# Patient Record
Sex: MALE | Race: BLACK OR AFRICAN AMERICAN
[De-identification: names, ages, dates, MRNs, and addresses within clinical notes are randomized per-mention and may not be internally consistent; named-entity substitution may affect disease eponyms.]

---

## 2019-07-22 ENCOUNTER — HOSPITAL ENCOUNTER (EMERGENCY)
Dept: HOSPITAL 50 - VM.ED | Age: 19
LOS: 1 days | Discharge: HOME | End: 2019-07-23
Payer: COMMERCIAL

## 2019-07-22 DIAGNOSIS — N17.9: Primary | ICD-10-CM

## 2019-07-22 DIAGNOSIS — S70.11XA: ICD-10-CM

## 2019-07-22 DIAGNOSIS — R74.8: ICD-10-CM
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
## 2019-07-22 LAB
ANION GAP SERPL CALC-SCNC: 13.6 MMOL/L (ref 10–20)
CHLORIDE SERPL-SCNC: 103 MMOL/L (ref 98–107)
SODIUM SERPL-SCNC: 143 MMOL/L (ref 136–145)

## 2019-07-22 PROCEDURE — 83735 ASSAY OF MAGNESIUM: CPT

## 2019-07-22 PROCEDURE — 73700 CT LOWER EXTREMITY W/O DYE: CPT

## 2019-07-22 PROCEDURE — 96361 HYDRATE IV INFUSION ADD-ON: CPT

## 2019-07-22 PROCEDURE — 82550 ASSAY OF CK (CPK): CPT

## 2019-07-22 PROCEDURE — 96360 HYDRATION IV INFUSION INIT: CPT

## 2019-07-22 PROCEDURE — 99284 EMERGENCY DEPT VISIT MOD MDM: CPT

## 2019-07-22 PROCEDURE — 80048 BASIC METABOLIC PNL TOTAL CA: CPT

## 2019-07-22 PROCEDURE — 85025 COMPLETE CBC W/AUTO DIFF WBC: CPT

## 2019-07-22 NOTE — EDM.PDOC
ED HPI GENERAL MEDICAL PROBLEM





- General


Chief Complaint: Lower Extremity Injury/Pain


Stated Complaint: INJURY TO R THIGH


Time Seen by Provider: 07/22/19 23:06


Source of Information: Reports: Patient, RN, RN Notes Reviewed


History Limitations: Reports: No Limitations





- History of Present Illness


INITIAL COMMENTS - FREE TEXT/NARRATIVE: 


Patient presents to the ED at [facility] for the evaluation of the right 

lateral lower thigh injury. The patient states the injury occurred one week 

ago. He states during football practice, another player rammed his knee into 

the patients thigh. The patient did not do anything initially with the injury. 

He has been "just trying to walk it off."  The thigh pain has progressively 

gotten worse to the point of trouble with ambulation. He is only able to bend 

his knee about 10 degrees. He has a previous right knee arthroscopy. He denies 

any numbness, tingling, or paresthesia to the RLE.


  ** Right Upper Thigh


Pain Score (Numeric/FACES): 8





- Related Data


 Allergies











Allergy/AdvReac Type Severity Reaction Status Date / Time


 


No Known Allergies Allergy   Verified 07/22/19 23:11











Home Meds: 


 Home Meds





. [No Known Home Meds]  04/14/19 [History]











Past Medical History





- Past Health History


Medical/Surgical History: Denies Medical/Surgical History





Review of Systems





- Review of Systems


Review Of Systems: See Below


Constitutional: Denies: Chills, Fever


Respiratory: Denies: Shortness of Breath, Cough


Cardiovascular: Denies: Chest Pain, Palpitations


Musculoskeletal: Reports: Leg Pain, Muscle Pain, Muscle Stiffness


Skin: Reports: Erythema (right lateral thigh)


Neurological: Reports: No Symptoms





ED EXAM, GENERAL





- Physical Exam


Exam: See Below


Exam Limited By: No Limitations


General Appearance: Alert, No Apparent Distress


Respiratory/Chest: No Respiratory Distress, Lungs Clear, Normal Breath Sounds


Cardiovascular: Normal Peripheral Pulses, Regular Rate, Rhythm


Peripheral Pulses: 2+: Posterior Tibial (L), Posterior Tibial (R), Dorsalis 

Pedis (L), Dorsalis Pedis (R)


Extremities: Leg Pain, Limited Range of Motion, Increased Warmth, Redness, 

Other (swelling of the right lower lateral thigh; very taught; unable to bend 

knee past 10 degrees; tender to light palpation)


Neurological: Alert, Oriented





Course





- Vital Signs


Last Recorded V/S: 


 Last Vital Signs











Temp  97.5 F   07/22/19 23:28


 


Pulse  75   07/22/19 23:28


 


Resp  14   07/22/19 23:28


 


BP  167/59 H  07/22/19 23:28


 


Pulse Ox  97   07/22/19 23:28














- Orders/Labs/Meds


Orders: 


 Active Orders 24 hr











 Category Date Time Status


 


 Lower Extremity wo Cont Rt [CT] Stat Exams  07/22/19 23:08 Taken


 


 Sodium Chloride 0.9% [Normal Saline] 2,000 ml Med  07/22/19 23:54 Active





 IV ONETIME   


 


 Sodium Chloride 0.9% [Saline Flush] Med  07/22/19 23:12 Active





 10 ml FLUSH ASDIRECTED PRN   


 


 Peripheral IV Insertion Adult [OM.PC] Routine Oth  07/22/19 23:12 Ordered








 Medication Orders





Sodium Chloride (Normal Saline)  2,000 mls @ 999 mls/hr IV ONETIME ONE


   Stop: 07/23/19 01:54


   Last Admin: 07/22/19 23:59  Dose: 999 mls/hr


Sodium Chloride (Saline Flush)  10 ml FLUSH ASDIRECTED PRN


   PRN Reason: Keep Vein Open








Labs: 


 Laboratory Tests











  07/22/19 07/22/19 07/22/19 Range/Units





  23:20 23:20 23:20 


 


WBC  8.9    (4.0-10.0)  x10^3/uL


 


RBC  4.96    (4.5-6.0)  x10^6/uL


 


Hgb  15.2    (14.0-18.0)  g/dL


 


Hct  45.1    (40.0-52.0)  %


 


MCV  90.9    (78.0-93.0)  fL


 


MCH  30.6    (26.0-32.0)  pg


 


MCHC  33.7    (32.0-36.0)  g/dL


 


RDW Coeff of Catia  12.8    (10.0-15.0)  %


 


Plt Count  228    (130-400)  x10^3/uL


 


Neut % (Auto)  58.3    (50.0-80.0)  %


 


Lymph % (Auto)  31.2    (25.0-50.0)  %


 


Mono % (Auto)  9.6    (2.0-11.0)  %


 


Eos % (Auto)  0.6    (0.0-4.0)  %


 


Baso % (Auto)  0.3    (0.2-1.2)  %


 


Sodium   143   (136-145)  mmol/L


 


Potassium   3.6   (3.5-5.1)  mmol/L


 


Chloride   103   ()  mmol/L


 


Carbon Dioxide   30   (21-32)  mmol/L


 


Anion Gap   13.6   (10-20)  mmol/L


 


BUN   21 H   (7-18)  mg/dL


 


Creatinine   1.5 H   (0.70-1.30)  mg/dL


 


Est Cr Clr Drug Dosing   73.44   mL/min


 


Estimated GFR (MDRD)   > 60   


 


Glucose   111 H   ()  mg/dL


 


Calcium   9.1   (8.5-10.1)  mg/dL


 


Magnesium    1.9  (1.8-2.4)  mg/dL


 


Creatine Kinase   682 H*   ()  U/L











Meds: 


Medications











Generic Name Dose Route Start Last Admin





  Trade Name Freq  PRN Reason Stop Dose Admin


 


Sodium Chloride  2,000 mls @ 999 mls/hr  07/22/19 23:54  07/22/19 23:59





  Normal Saline  IV  07/23/19 01:54  999 mls/hr





  ONETIME ONE   Administration





     





     





     





     


 


Sodium Chloride  10 ml  07/22/19 23:12  





  Saline Flush  FLUSH   





  ASDIRECTED PRN   





  Keep Vein Open   





     





     





     














Departure





- Departure


Time of Disposition: 00:13


Disposition: Home, Self-Care 01


Condition: Good


Clinical Impression: 


 Muscle contusion, DEE (acute kidney injury), Elevated CK








- Discharge Information


*PRESCRIPTION DRUG MONITORING PROGRAM REVIEWED*: Not Applicable


*COPY OF PRESCRIPTION DRUG MONITORING REPORT IN PATIENT WILLIAN: Not Applicable


Instructions:  Acute Kidney Injury, Adult, Contusion


Referrals: 


Reagan Jordan NP [Emergency Provider] - 


Forms:  ED Department Discharge, ED Return to Work/School Form


Additional Instructions: 


1. Stay well hydrated and rest


2. Elevated right lower leg several times a day


3. May use ice/heat


4. Use Tylenol only, no Advil, Motrin, Aleve, or NSAID products


5. Will need extensive physical therapy


6. May consider MRI scan


7. No physical activity until symptoms have resolved


8. See me at the [facility] tomorrow and will get you set up for physical 

therapy





- Problem List Review


Problem List Initiated/Reviewed/Updated: Yes





- My Orders


Last 24 Hours: 


My Active Orders





07/22/19 23:08


Lower Extremity wo Cont Rt [CT] Stat 





07/22/19 23:12


Sodium Chloride 0.9% [Saline Flush]   10 ml FLUSH ASDIRECTED PRN 


Peripheral IV Insertion Adult [OM.PC] Routine 





07/22/19 23:54


Sodium Chloride 0.9% [Normal Saline] 2,000 ml IV ONETIME 














- Assessment/Plan


Last 24 Hours: 


My Active Orders





07/22/19 23:08


Lower Extremity wo Cont Rt [CT] Stat 





07/22/19 23:12


Sodium Chloride 0.9% [Saline Flush]   10 ml FLUSH ASDIRECTED PRN 


Peripheral IV Insertion Adult [OM.PC] Routine 





07/22/19 23:54


Sodium Chloride 0.9% [Normal Saline] 2,000 ml IV ONETIME 











Assessment:: 


Muscle contusion


DEE


Elevated CK


Plan: 


Labs and CT results discussed with patient. Patient needs physical therapy, no 

football or active sports, LOTS of water, and OTC analgesics. Will see patient 

in clinic tomorrow to set up physical therapy. May consider MRI depending upon 

therapy assessment.

## 2019-07-23 NOTE — CT
______________________________________________________________________________   

  

[id number] CT/CT Femur Right WO IV  

EXAM: RIGHT FEMUR CT WITHOUT CONTRAST  

   

 INDICATION: Right thigh contusion with eye pain and ataxia.  

   

 COMPARISON: None.  

   

 DISCUSSION: Ill-defined hypodensity is suggested in the distal aspect of the  

 vastus intermedius which could represent a contusion or sequela of muscle  

 strain. MRI could provide further assessment if there is continued clinical  

 concern. No fracture or dislocation.  

   

 IMPRESSION:  

 1.  Possible ill-defined hematoma within the distal aspect of the vastus  

 intermedius.  

 2.  Negative for acute fracture.  

   

 Electronically signed by Oswaldo Coffey MD on 7/23/2019 8:23 AM  

   

  

Oswaldo Coffey MD                 

 07/23/19 0825    

  

Thank you for allowing us to participate in the care of your patient.

## 2019-10-06 ENCOUNTER — HOSPITAL ENCOUNTER (EMERGENCY)
Dept: HOSPITAL 50 - VM.ED | Age: 19
Discharge: HOME | End: 2019-10-06
Payer: COMMERCIAL
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
## 2019-10-06 DIAGNOSIS — S93.601A: Primary | ICD-10-CM

## 2019-10-06 NOTE — CR
______________________________________________________________________________   

  

[id number] RAD/RAD Foot Right 2V  

EXAM: RIGHT FOOT 2 VIEWS  

   

 INDICATION: FOOT PAIN.  

   

 COMPARISON: None.  

   

 DISCUSSION: Soft tissue swelling in the dorsum of the foot. Mild first  

 metatarsophalangeal and first tarsometatarsal osteoarthritis. No acute fracture  

 or dislocation is identified.  

   

 IMPRESSION:  

 1.  Soft tissue swelling.  

 2.  Mild osteoarthritis.  

   

 Electronically signed by Oswaldo Coffey MD on 10/6/2019 12:43 PM  

   

  

Oswaldo Coffey MD                 

 10/06/19 1246    

  

Thank you for allowing us to participate in the care of your patient.

## 2022-06-27 ENCOUNTER — APPOINTMENT (RX ONLY)
Dept: URBAN - METROPOLITAN AREA CLINIC 6 | Facility: CLINIC | Age: 22
Setting detail: DERMATOLOGY
End: 2022-06-27

## 2022-06-27 DIAGNOSIS — B36.0 PITYRIASIS VERSICOLOR: ICD-10-CM

## 2022-06-27 PROCEDURE — ? DIAGNOSIS COMMENT

## 2022-06-27 PROCEDURE — 99203 OFFICE O/P NEW LOW 30 MIN: CPT

## 2022-06-27 PROCEDURE — ? COUNSELING

## 2022-06-27 PROCEDURE — ? PRESCRIPTION

## 2022-06-27 RX ORDER — KETOCONAZOLE 20 MG/G
1 CREAM TOPICAL BID
Qty: 60 | Refills: 5 | Status: ERX | COMMUNITY
Start: 2022-06-27

## 2022-06-27 RX ADMIN — KETOCONAZOLE 1: 20 CREAM TOPICAL at 00:00

## 2022-06-27 ASSESSMENT — LOCATION DETAILED DESCRIPTION DERM
LOCATION DETAILED: LEFT SUPERIOR LATERAL MIDBACK
LOCATION DETAILED: SUPERIOR LUMBAR SPINE
LOCATION DETAILED: RIGHT SUPERIOR LATERAL MIDBACK

## 2022-06-27 ASSESSMENT — LOCATION SIMPLE DESCRIPTION DERM
LOCATION SIMPLE: RIGHT LOWER BACK
LOCATION SIMPLE: LEFT LOWER BACK
LOCATION SIMPLE: LOWER BACK

## 2022-06-27 ASSESSMENT — LOCATION ZONE DERM: LOCATION ZONE: TRUNK

## 2022-06-27 NOTE — PROCEDURE: DIAGNOSIS COMMENT
Comment: Present for one year. Will treat with topical ketoconazole cream daily x 1 week, then weekly x 1 month. \\nEncouraged sun protective clothing and sunscreen when outdoors. Follow up in 3 months.
Render Risk Assessment In Note?: no
Detail Level: Simple

## 2022-08-20 ENCOUNTER — HOSPITAL ENCOUNTER (EMERGENCY)
Facility: MEDICAL CENTER | Age: 22
End: 2022-08-21
Attending: EMERGENCY MEDICINE
Payer: COMMERCIAL

## 2022-08-20 ENCOUNTER — APPOINTMENT (OUTPATIENT)
Dept: RADIOLOGY | Facility: MEDICAL CENTER | Age: 22
End: 2022-08-20
Attending: EMERGENCY MEDICINE
Payer: COMMERCIAL

## 2022-08-20 DIAGNOSIS — E87.6 HYPOKALEMIA: ICD-10-CM

## 2022-08-20 DIAGNOSIS — I48.91 ATRIAL FIBRILLATION WITH RVR (HCC): ICD-10-CM

## 2022-08-20 DIAGNOSIS — T43.615A ADVERSE EFFECT OF CAFFEINE, INITIAL ENCOUNTER: ICD-10-CM

## 2022-08-20 LAB
ALBUMIN SERPL BCP-MCNC: 4.4 G/DL (ref 3.2–4.9)
ALBUMIN/GLOB SERPL: 1.7 G/DL
ALP SERPL-CCNC: 61 U/L (ref 30–99)
ALT SERPL-CCNC: 27 U/L (ref 2–50)
ANION GAP SERPL CALC-SCNC: 15 MMOL/L (ref 7–16)
AST SERPL-CCNC: 37 U/L (ref 12–45)
BASOPHILS # BLD AUTO: 0.4 % (ref 0–1.8)
BASOPHILS # BLD: 0.03 K/UL (ref 0–0.12)
BILIRUB SERPL-MCNC: 0.3 MG/DL (ref 0.1–1.5)
BUN SERPL-MCNC: 13 MG/DL (ref 8–22)
CALCIUM SERPL-MCNC: 9.4 MG/DL (ref 8.5–10.5)
CHLORIDE SERPL-SCNC: 104 MMOL/L (ref 96–112)
CO2 SERPL-SCNC: 19 MMOL/L (ref 20–33)
CREAT SERPL-MCNC: 1.1 MG/DL (ref 0.5–1.4)
EOSINOPHIL # BLD AUTO: 0.03 K/UL (ref 0–0.51)
EOSINOPHIL NFR BLD: 0.4 % (ref 0–6.9)
ERYTHROCYTE [DISTWIDTH] IN BLOOD BY AUTOMATED COUNT: 43 FL (ref 35.9–50)
GFR SERPLBLD CREATININE-BSD FMLA CKD-EPI: 96 ML/MIN/1.73 M 2
GLOBULIN SER CALC-MCNC: 2.6 G/DL (ref 1.9–3.5)
GLUCOSE SERPL-MCNC: 135 MG/DL (ref 65–99)
HCT VFR BLD AUTO: 46.2 % (ref 42–52)
HGB BLD-MCNC: 16 G/DL (ref 14–18)
IMM GRANULOCYTES # BLD AUTO: 0.01 K/UL (ref 0–0.11)
IMM GRANULOCYTES NFR BLD AUTO: 0.1 % (ref 0–0.9)
LYMPHOCYTES # BLD AUTO: 3.06 K/UL (ref 1–4.8)
LYMPHOCYTES NFR BLD: 41.9 % (ref 22–41)
MCH RBC QN AUTO: 30.6 PG (ref 27–33)
MCHC RBC AUTO-ENTMCNC: 34.6 G/DL (ref 33.7–35.3)
MCV RBC AUTO: 88.3 FL (ref 81.4–97.8)
MONOCYTES # BLD AUTO: 0.54 K/UL (ref 0–0.85)
MONOCYTES NFR BLD AUTO: 7.4 % (ref 0–13.4)
NEUTROPHILS # BLD AUTO: 3.64 K/UL (ref 1.82–7.42)
NEUTROPHILS NFR BLD: 49.8 % (ref 44–72)
NRBC # BLD AUTO: 0 K/UL
NRBC BLD-RTO: 0 /100 WBC
PLATELET # BLD AUTO: 250 K/UL (ref 164–446)
PMV BLD AUTO: 10.7 FL (ref 9–12.9)
POTASSIUM SERPL-SCNC: 3.4 MMOL/L (ref 3.6–5.5)
PROT SERPL-MCNC: 7 G/DL (ref 6–8.2)
RBC # BLD AUTO: 5.23 M/UL (ref 4.7–6.1)
SODIUM SERPL-SCNC: 138 MMOL/L (ref 135–145)
TROPONIN T SERPL-MCNC: 16 NG/L (ref 6–19)
WBC # BLD AUTO: 7.3 K/UL (ref 4.8–10.8)

## 2022-08-20 PROCEDURE — 700105 HCHG RX REV CODE 258: Performed by: EMERGENCY MEDICINE

## 2022-08-20 PROCEDURE — 93005 ELECTROCARDIOGRAM TRACING: CPT | Performed by: EMERGENCY MEDICINE

## 2022-08-20 PROCEDURE — 85025 COMPLETE CBC W/AUTO DIFF WBC: CPT

## 2022-08-20 PROCEDURE — 71045 X-RAY EXAM CHEST 1 VIEW: CPT

## 2022-08-20 PROCEDURE — 700111 HCHG RX REV CODE 636 W/ 250 OVERRIDE (IP): Mod: JW | Performed by: EMERGENCY MEDICINE

## 2022-08-20 PROCEDURE — 99285 EMERGENCY DEPT VISIT HI MDM: CPT

## 2022-08-20 PROCEDURE — 84484 ASSAY OF TROPONIN QUANT: CPT

## 2022-08-20 PROCEDURE — 96375 TX/PRO/DX INJ NEW DRUG ADDON: CPT

## 2022-08-20 PROCEDURE — 80053 COMPREHEN METABOLIC PANEL: CPT

## 2022-08-20 PROCEDURE — 36415 COLL VENOUS BLD VENIPUNCTURE: CPT

## 2022-08-20 RX ORDER — POTASSIUM CHLORIDE 20 MEQ/1
20 TABLET, EXTENDED RELEASE ORAL ONCE
Status: COMPLETED | OUTPATIENT
Start: 2022-08-20 | End: 2022-08-21

## 2022-08-20 RX ORDER — MIDAZOLAM HYDROCHLORIDE 1 MG/ML
1 INJECTION INTRAMUSCULAR; INTRAVENOUS ONCE
Status: COMPLETED | OUTPATIENT
Start: 2022-08-20 | End: 2022-08-20

## 2022-08-20 RX ORDER — MAGNESIUM SULFATE 1 G/100ML
1 INJECTION INTRAVENOUS ONCE
Status: COMPLETED | OUTPATIENT
Start: 2022-08-20 | End: 2022-08-21

## 2022-08-20 RX ORDER — DILTIAZEM HYDROCHLORIDE 5 MG/ML
10 INJECTION INTRAVENOUS ONCE
Status: COMPLETED | OUTPATIENT
Start: 2022-08-20 | End: 2022-08-20

## 2022-08-20 RX ORDER — SODIUM CHLORIDE 9 MG/ML
1000 INJECTION, SOLUTION INTRAVENOUS ONCE
Status: COMPLETED | OUTPATIENT
Start: 2022-08-20 | End: 2022-08-21

## 2022-08-20 RX ADMIN — MIDAZOLAM HYDROCHLORIDE 1 MG: 1 INJECTION, SOLUTION INTRAMUSCULAR; INTRAVENOUS at 22:36

## 2022-08-20 RX ADMIN — DILTIAZEM HYDROCHLORIDE 10 MG: 5 INJECTION INTRAVENOUS at 22:36

## 2022-08-20 RX ADMIN — SODIUM CHLORIDE 1000 ML: 9 INJECTION, SOLUTION INTRAVENOUS at 22:36

## 2022-08-20 ASSESSMENT — LIFESTYLE VARIABLES
EVER HAD A DRINK FIRST THING IN THE MORNING TO STEADY YOUR NERVES TO GET RID OF A HANGOVER: NO
TOTAL SCORE: 0
ON A TYPICAL DAY WHEN YOU DRINK ALCOHOL HOW MANY DRINKS DO YOU HAVE: 0
HAVE YOU EVER FELT YOU SHOULD CUT DOWN ON YOUR DRINKING: NO
HAVE PEOPLE ANNOYED YOU BY CRITICIZING YOUR DRINKING: NO
TOTAL SCORE: 0
AVERAGE NUMBER OF DAYS PER WEEK YOU HAVE A DRINK CONTAINING ALCOHOL: 0
TOTAL SCORE: 0
DO YOU DRINK ALCOHOL: NO
HOW MANY TIMES IN THE PAST YEAR HAVE YOU HAD 5 OR MORE DRINKS IN A DAY: 0
EVER FELT BAD OR GUILTY ABOUT YOUR DRINKING: NO
DOES PATIENT WANT TO STOP DRINKING: NO
CONSUMPTION TOTAL: NEGATIVE

## 2022-08-21 VITALS
RESPIRATION RATE: 15 BRPM | TEMPERATURE: 98.5 F | HEIGHT: 67 IN | HEART RATE: 91 BPM | DIASTOLIC BLOOD PRESSURE: 66 MMHG | OXYGEN SATURATION: 93 % | WEIGHT: 210 LBS | BODY MASS INDEX: 32.96 KG/M2 | SYSTOLIC BLOOD PRESSURE: 147 MMHG

## 2022-08-21 LAB
EKG IMPRESSION: NORMAL
EKG IMPRESSION: NORMAL

## 2022-08-21 PROCEDURE — A9270 NON-COVERED ITEM OR SERVICE: HCPCS | Performed by: EMERGENCY MEDICINE

## 2022-08-21 PROCEDURE — 96365 THER/PROPH/DIAG IV INF INIT: CPT

## 2022-08-21 PROCEDURE — 700102 HCHG RX REV CODE 250 W/ 637 OVERRIDE(OP): Performed by: EMERGENCY MEDICINE

## 2022-08-21 PROCEDURE — 700111 HCHG RX REV CODE 636 W/ 250 OVERRIDE (IP): Performed by: EMERGENCY MEDICINE

## 2022-08-21 RX ADMIN — MAGNESIUM SULFATE IN DEXTROSE 1 G: 10 INJECTION, SOLUTION INTRAVENOUS at 00:12

## 2022-08-21 RX ADMIN — POTASSIUM CHLORIDE 20 MEQ: 1500 TABLET, EXTENDED RELEASE ORAL at 00:12

## 2022-08-21 NOTE — DISCHARGE INSTRUCTIONS
Avoid alcohol and caffeine. Return if you have new or different chest pain, shortness of breath, pass out or persistently rapid heart rate above 110.

## 2022-08-21 NOTE — ED PROVIDER NOTES
ED Provider Note    Scribed for Joel Cole M.D. by Thang Diamond. 8/20/2022, 10:23 PM.    Primary care provider: None noted  Means of arrival: EMS  History obtained from: Patient  History limited by: None    CHIEF COMPLAINT  Chief Complaint   Patient presents with    Chest Pain     Pt took 500mg of caffeine and preworkout this afternoon. He vomited some of it up and then went to dinner. Chest pain began at 2130. 12 lead with EMS showed afib with RVR, rates between 110-160    Anxiety       HPI  Jinny Desai is a 21 y.o. male who presents to the Emergency Department for evaluation of chest pain/pressure onset this morning. Patient states he was at the gym this morning. He took both an energy drink with 500 mg caffeine and preworkout. While doing squats, he experienced chest tightness and palpitations lasting for 30 minutes. It resolved but after he got home, he experienced another episode. He called EMS due to ongoing chest tightness and palpitations. He reports anxiety.  He had one epiosde of vomiting and some chills. Denies any swelling or pain of one leg. Denies fevers or cough. He notes that he had 6-7 alcoholic beverages last night. Denies any known history of heart problems. Denies any regular medications. Denies any significant medical history. He vapes socially. States he drinks alcohol on occasion. He reports occasional marijuana use. Denies meth use.    REVIEW OF SYSTEMS  Pertinent positives include chest pain/pressure, palpitations, anxiety, chills, vomiting. Pertinent negatives include leg pain/swelling, fevers, cough. All other systems negative.    PAST MEDICAL HISTORY   Patient denies any known significant medical history.    SURGICAL HISTORY  patient denies any surgical history    SOCIAL HISTORY  Social History     Tobacco Use    Smoking status: Never    Smokeless tobacco: Never   Vaping Use    Vaping Use: Never used   Substance Use Topics    Alcohol use: Yes     Alcohol/week: 7.2 oz  "    Types: 12 Cans of beer per week    Drug use: Yes     Comment: occ marijuana      Social History     Substance and Sexual Activity   Drug Use Yes    Comment: occ marijuana       FAMILY HISTORY  History reviewed. No pertinent family history.    CURRENT MEDICATIONS  Home Medications    **Home medications have not yet been reviewed for this encounter**         ALLERGIES  No Known Allergies    PHYSICAL EXAM  VITAL SIGNS: BP (!) 149/81   Pulse (!) 120   Temp 36.7 °C (98 °F)   Resp 20   Ht 1.702 m (5' 7\")   Wt 95.3 kg (210 lb)   SpO2 99%   BMI 32.89 kg/m²   Constitutional: Well developed, Well nourished, moderate distress.   HENT: Normocephalic, Atraumatic, mask in place.  Eyes: Conjunctiva normal, No discharge.   Cardiovascular: Tachycardic, Irregularly irregular, No murmurs, equal pulses.   Pulmonary: Normal breath sounds, No respiratory distress, No wheezing, No rales, No rhonchi.  Chest: No chest wall tenderness or deformity.   Abdomen:Soft, No tenderness, No masses, no rebound, no guarding.    Musculoskeletal: No major deformities noted, No tenderness.  No edema in his calves, no calf tenderness  Skin: Warm, Dry, No erythema, No rash.   Neurologic: Alert & oriented x 3, Normal motor function,  No focal deficits noted.   Psychiatric: Affect normal, Judgment normal, Mood normal.     LAB  Results for orders placed or performed during the hospital encounter of 08/20/22   CBC with Differential   Result Value Ref Range    WBC 7.3 4.8 - 10.8 K/uL    RBC 5.23 4.70 - 6.10 M/uL    Hemoglobin 16.0 14.0 - 18.0 g/dL    Hematocrit 46.2 42.0 - 52.0 %    MCV 88.3 81.4 - 97.8 fL    MCH 30.6 27.0 - 33.0 pg    MCHC 34.6 33.7 - 35.3 g/dL    RDW 43.0 35.9 - 50.0 fL    Platelet Count 250 164 - 446 K/uL    MPV 10.7 9.0 - 12.9 fL    Neutrophils-Polys 49.80 44.00 - 72.00 %    Lymphocytes 41.90 (H) 22.00 - 41.00 %    Monocytes 7.40 0.00 - 13.40 %    Eosinophils 0.40 0.00 - 6.90 %    Basophils 0.40 0.00 - 1.80 %    Immature " Granulocytes 0.10 0.00 - 0.90 %    Nucleated RBC 0.00 /100 WBC    Neutrophils (Absolute) 3.64 1.82 - 7.42 K/uL    Lymphs (Absolute) 3.06 1.00 - 4.80 K/uL    Monos (Absolute) 0.54 0.00 - 0.85 K/uL    Eos (Absolute) 0.03 0.00 - 0.51 K/uL    Baso (Absolute) 0.03 0.00 - 0.12 K/uL    Immature Granulocytes (abs) 0.01 0.00 - 0.11 K/uL    NRBC (Absolute) 0.00 K/uL   Complete Metabolic Panel (CMP)   Result Value Ref Range    Sodium 138 135 - 145 mmol/L    Potassium 3.4 (L) 3.6 - 5.5 mmol/L    Chloride 104 96 - 112 mmol/L    Co2 19 (L) 20 - 33 mmol/L    Anion Gap 15.0 7.0 - 16.0    Glucose 135 (H) 65 - 99 mg/dL    Bun 13 8 - 22 mg/dL    Creatinine 1.10 0.50 - 1.40 mg/dL    Calcium 9.4 8.5 - 10.5 mg/dL    AST(SGOT) 37 12 - 45 U/L    ALT(SGPT) 27 2 - 50 U/L    Alkaline Phosphatase 61 30 - 99 U/L    Total Bilirubin 0.3 0.1 - 1.5 mg/dL    Albumin 4.4 3.2 - 4.9 g/dL    Total Protein 7.0 6.0 - 8.2 g/dL    Globulin 2.6 1.9 - 3.5 g/dL    A-G Ratio 1.7 g/dL   Troponin   Result Value Ref Range    Troponin T 16 6 - 19 ng/L   ESTIMATED GFR   Result Value Ref Range    GFR (CKD-EPI) 96 >60 mL/min/1.73 m 2   EKG   Result Value Ref Range    Report       [facility] Emergency Dept.    Test Date:  2022  Pt Name:    IVANNA JO             Department: ER  MRN:        [id number]                      Room:       RD 05  Gender:     Male                         Technician: [id number]  :        2000                   Requested By:ER TRIAGE PROTOCOL  Order #:    [id number]                    Reading MD: RHETT KAY MD    Measurements  Intervals                                Axis  Rate:       113                          P:  VT:                                      QRS:        63  QRSD:       88                           T:          -70  QT:         315  QTc:        432    Interpretive Statements  Atrial fibrillation, rate of 113, normal axis,  Nonspecific T abnormalities, inferior leads  No previous ECG  available for comparison  Electronically Signed On 2022 0:33:32 PDT by RHETT KAY MD     EKG (NOW)   Result Value Ref Range    Report       [facility] Emergency Dept.    Test Date:  2022  Pt Name:    IVANNA JO             Department: ER  MRN:        [id number]                      Room:        05  Gender:     Male                         Technician: [id number]  :        2000                   Requested By:RHETT KAY  Order #:    [id number]                    Reading MD: RHETT KAY MD    Measurements  Intervals                                Axis  Rate:       84                           P:          0  NJ:         179                          QRS:        56  QRSD:       86                           T:          -61  QT:         375  QTc:        444    Interpretive Statements  Sinus rhythm, rate of 84, normal axis,  Multiple premature complexes, vent & supraven  Nonspecific T abnormalities, inferior leads  Baseline wander in lead(s) V1  Compared to ECG 2022 22:17:30  Atrial fibrillation no longer present  T-wave abnormality still present  Electronically Signed On  0:33:20 PDT by RHETT KAY MD         All labs reviewed by me.    EKG  12 Lead EKG interpreted by me. See Labs section.     RADIOLOGY  DX-CHEST-PORTABLE (1 VIEW)   Final Result      No acute cardiopulmonary disease evident.        The radiologist's interpretation of all radiological studies have been reviewed by me.    COURSE & MEDICAL DECISION MAKING  Pertinent Labs & Imaging studies reviewed. (See chart for details)    10:23 PM - Patient seen and examined at bedside. Patient will be treated with diltiazem injection 10 mg, versed injection 1 mg. Ordered DX chest, CBC with differential, CMP, troponin, EKG to evaluate his symptoms. The differential diagnoses include but are not limited to: a-fib with rvr, caffeine intoxication, dehydration.    HYDRATION: Based on  the patient's presentation of Tachycardia the patient was given IV fluids. IV Hydration was used because oral hydration was not as rapid as required. Upon recheck following hydration, the patient was improved.     Patient's heart rate improved with IV fluids as well as Cardizem.  Repeat EKG shows now a sinus rhythm with multiple PACs and PVCs.  Discussed case with Dr. Judson guerin cardiology he feels that the patient can be discharged home with short course of metoprolol.    Medical Decision Making: This point time I think patient most likely has A. fib with RVR secondary to alcohol use last night as well as significant caffeine use today.  Patient was given a single dose of diltiazem with that his rate is controlled and appears he is in a sinus rhythm.  He does not have any signs of a myocardial infarction he is otherwise stable I think he can be discharged home with a short course of metoprolol I will have him follow-up with cardiology as an outpatient     The patient will return for new or worsening symptoms and is stable at the time of discharge.    The patient is referred to a primary physician for blood pressure management, diabetic screening, and for all other preventative health concerns.        DISPOSITION:  Patient will be discharged home in stable condition.    FOLLOW UP:  Judson Guerin M.D.  [address] [facility] [address]  [facility] [id number] [phone]    Schedule an appointment as soon as possible for a visit in 1 week        OUTPATIENT MEDICATIONS:  New Prescriptions    METOPROLOL TARTRATE (LOPRESSOR) 25 MG TAB    Take 0.5 Tablets by mouth 2 times a day.          FINAL IMPRESSION  1. Atrial fibrillation with RVR (HCC)    2. Adverse effect of caffeine, initial encounter    3. Hypokalemia          Thang CONNER (Zohreh), am scribing for, and in the presence of, Joel Cole M.D.    Electronically signed by: Thang Diamond (Zohreh), 8/20/2022    Joel CONNER M.D. personally  performed the services described in this documentation, as scribed by Thang Diamond in my presence, and it is both accurate and complete.    The note accurately reflects work and decisions made by me.  Joel Cole M.D.  8/21/2022  12:35 AM

## 2022-08-21 NOTE — ED NOTES
Patient provided with discharge instructions. Education complete, all questions answered.   Lines removed, vitals stable. All personal belongings accounted for.   Patient ambulated out of the ER with family.

## 2022-08-21 NOTE — ED TRIAGE NOTES
"Chief Complaint   Patient presents with    Chest Pain     Pt took 500mg of caffeine and preworkout this afternoon. He vomited some of it up and then went to dinner. Chest pain began at 2130. 12 lead with EMS showed afib with RVR, rates between 110-160    Anxiety     PIV placed with EMS     BP (!) 149/81   Pulse (!) 120   Temp 36.7 °C (98 °F)   Resp 20   Ht 1.702 m (5' 7\")   Wt 95.3 kg (210 lb)   SpO2 99%   BMI 32.89 kg/m²     "

## 2022-08-22 ENCOUNTER — TELEPHONE (OUTPATIENT)
Dept: CARDIOLOGY | Facility: MEDICAL CENTER | Age: 22
End: 2022-08-22
Payer: COMMERCIAL

## 2022-08-22 NOTE — TELEPHONE ENCOUNTER
"\" I think he can be discharged home with a short course of metoprolol I will have him follow-up with cardiology as an outpatient\"    Called and spoke with pt. He is agreeable to this. Will schedule for NP appt  "

## 2022-08-22 NOTE — TELEPHONE ENCOUNTER
ADD    Caller: Coby (mother)    Medication Name and Dosage:   metoprolol tartrate (LOPRESSOR) 25 MG Tab [[id number]]       Did patient contact pharmacy (If no, please call pharmacy first): yes    Medication amount left: only recvd 3 tablets    [facility]    Other questions (Topic): N/A    Callback Number (Will only call for issues):  [phone]    Thank you,   Anna SILVEIRA

## 2022-10-17 ENCOUNTER — TELEPHONE (OUTPATIENT)
Dept: CARDIOLOGY | Facility: MEDICAL CENTER | Age: 22
End: 2022-10-17
Payer: COMMERCIAL

## 2022-10-17 NOTE — TELEPHONE ENCOUNTER
Called patient to request records for NP appointment with Dr.Al Kang. Called to confirm if this will be first time patient is seeing a cardiologist. No answer, left voicemail to call back.    Pending call back.

## 2023-09-04 ENCOUNTER — HOSPITAL ENCOUNTER (EMERGENCY)
Facility: MEDICAL CENTER | Age: 23
End: 2023-09-04
Attending: EMERGENCY MEDICINE
Payer: COMMERCIAL

## 2023-09-04 VITALS
BODY MASS INDEX: 30.61 KG/M2 | HEIGHT: 67 IN | TEMPERATURE: 97.7 F | WEIGHT: 195 LBS | DIASTOLIC BLOOD PRESSURE: 82 MMHG | OXYGEN SATURATION: 95 % | SYSTOLIC BLOOD PRESSURE: 131 MMHG | RESPIRATION RATE: 16 BRPM | HEART RATE: 84 BPM

## 2023-09-04 DIAGNOSIS — F10.929 ALCOHOLIC INTOXICATION WITH COMPLICATION (HCC): ICD-10-CM

## 2023-09-04 DIAGNOSIS — F10.10 ALCOHOL ABUSE: ICD-10-CM

## 2023-09-04 LAB
AMPHET UR QL SCN: NEGATIVE
BARBITURATES UR QL SCN: NEGATIVE
BENZODIAZ UR QL SCN: NEGATIVE
BZE UR QL SCN: NEGATIVE
CANNABINOIDS UR QL SCN: NEGATIVE
FENTANYL UR QL: NEGATIVE
METHADONE UR QL SCN: NEGATIVE
OPIATES UR QL SCN: NEGATIVE
OXYCODONE UR QL SCN: NEGATIVE
PCP UR QL SCN: NEGATIVE
POC BREATHALIZER: 0.18 PERCENT (ref 0–0.01)
PROPOXYPH UR QL SCN: NEGATIVE

## 2023-09-04 PROCEDURE — 99285 EMERGENCY DEPT VISIT HI MDM: CPT

## 2023-09-04 PROCEDURE — 80307 DRUG TEST PRSMV CHEM ANLYZR: CPT

## 2023-09-04 PROCEDURE — 302970 POC BREATHALIZER

## 2023-09-04 PROCEDURE — 302970 POC BREATHALIZER: Performed by: EMERGENCY MEDICINE

## 2023-09-04 ASSESSMENT — FIBROSIS 4 INDEX: FIB4 SCORE: 0.68

## 2023-09-04 ASSESSMENT — LIFESTYLE VARIABLES: DO YOU DRINK ALCOHOL: YES

## 2023-09-04 NOTE — ED NOTES
"Bedside report received from Shahbaz RN, assumed care of patient. Patient resting in gurney, respirations even, non labored, vitals stable. Gurney in locked and lowest position, rails raised for patient safety. Call light within reach, denies needs at this time.     Fall risk precautions: non slip socks, sitter  Continuous monitoring: n/a  IVF/IV medications: n/a  Oxygen: room air  Bedside sitter:  yes 1:1    /82   Pulse 84   Temp 36.5 °C (97.7 °F) (Temporal)   Resp 16   Ht 1.702 m (5' 7\")   Wt 88.5 kg (195 lb)   SpO2 95%     "

## 2023-09-04 NOTE — ED TRIAGE NOTES
"Chief Complaint   Patient presents with    Alcohol Intoxication     Reports drinking last night. Got into an argument and threatened to kill himself. Grabbed a knife and planned to cut himself. EMS was called.  Pt reports he felt 90% suicidal at that time and now feels 40%. States he would also use a gun.     Suicidal Ideation     BIB EMS from home for above. Pt is very cooperative. One bag of belongings collected. Pt changed into paper linen.    /82   Pulse 84   Temp 36.5 °C (97.7 °F) (Temporal)   Resp 16   Ht 1.702 m (5' 7\")   Wt 88.5 kg (195 lb)   SpO2 95%   BMI 30.54 kg/m²     "

## 2023-09-04 NOTE — ED NOTES
MD  at bedside to re evaluate patient, patient denies SI/HI, per ERP ok to discharge patient once in contact with patient girlfriend .

## 2023-09-04 NOTE — DISCHARGE PLANNING
Lesley from [facility] met with patient to provide outpatient addiction resources.

## 2023-09-04 NOTE — ED PROVIDER NOTES
"ED Provider Note    CHIEF COMPLAINT  Chief Complaint   Patient presents with    Alcohol Intoxication     Reports drinking last night. Got into an argument and threatened to kill himself. Grabbed a knife and planned to cut himself. EMS was called.  Pt reports he felt 90% suicidal at that time and now feels 40%. States he would also use a gun.     Suicidal Ideation         HPI/ROS  LIMITATION TO HISTORY   History significant limited  OUTSIDE HISTORIAN(S):  Reporting from EMS.    Jinny Desai is a 22 y.o. male who presents with alcohol intoxication.  Per EMS report pt reports he drank heavily and got argument threatening to kill himself.  Patient wakes up very briefly to painful stimuli but is refusing to answer any questions or interact otherwise.  He is moving all extremities.  History therefore significantly limited.    PAST MEDICAL HISTORY       SURGICAL HISTORY  patient denies any surgical history    FAMILY HISTORY  No family history on file.    SOCIAL HISTORY  Social History     Tobacco Use    Smoking status: Never    Smokeless tobacco: Never   Vaping Use    Vaping Use: Never used   Substance and Sexual Activity    Alcohol use: Yes     Alcohol/week: 7.2 oz     Types: 12 Cans of beer per week    Drug use: Yes     Comment: occ marijuana    Sexual activity: Not on file       CURRENT MEDICATIONS  Home Medications       Reviewed by Shahbaz Shah R.N. (Registered Nurse) on 09/04/23 at 0741  Med List Status: Partial     Medication Last Dose Status   metoprolol tartrate (LOPRESSOR) 25 MG Tab  Active                    ALLERGIES  No Known Allergies    PHYSICAL EXAM  VITAL SIGNS: /82   Pulse 84   Temp 36.5 °C (97.7 °F) (Temporal)   Resp 16   Ht 1.702 m (5' 7\")   Wt 88.5 kg (195 lb)   SpO2 95%   BMI 30.54 kg/m²    Physical Exam  Constitutional:       Appearance: Normal appearance.   HENT:      Mouth/Throat:      Mouth: Mucous membranes are moist.   Pulmonary:      Effort: Pulmonary effort is normal. "   Neurological:      General: No focal deficit present.      Mental Status: He is alert and oriented to person, place, and time.   Psychiatric:         Mood and Affect: Mood normal.           DIAGNOSTIC STUDIES / PROCEDURES      COURSE & MEDICAL DECISION MAKING    ED Observation Status? Yes; I am placing the patient in to an observation status due to a diagnostic uncertainty as well as therapeutic intensity. Patient placed in observation status at 8:51 AM, 9/4/2023.     Observation plan is as follows: Await clinical sobriety, ensure patient continues to sober as expected and ensure he does not have any ongoing emergent behavioral or psychiatric issues    Upon Reevaluation, the patient's condition has: Improved; and will be discharged.    Patient discharged from ED Observation status at 11:40 AM (Time) 09/04/23 (Date).     INITIAL ASSESSMENT, COURSE AND PLAN  Care Narrative: Patient here, he is significantly intoxicated, unable to provide history secondary to this.  Patient placed in ED observation while we await for him to sober.    On reassessment patient is ambulatory without any assistance.  He speaks with normal speech, he denies any SI, HI or any other psychiatric symptoms this point.  He reports if he was discharged to feel very safe.    We contacted patient's significant other who came to the emergency department.  On discussion of the case with her she is also comfortable with patient returning home and does not believe he is a risk to her, others or himself.  Patient has been seen by our SBIRT team who has given her multiple outpatient resources for alcohol abuse, depression and anxiety.  I have encouraged patient to establish a primary care physician.          Discussion of management with other QHP or appropriate source(s): SBIRT see above      Escalation of care considered, and ultimately not performed: As patient without any ongoing complaints once he reached clinical sobriety, legal hold, basic labs,  and work-up were deferred in lieu of strict return precautions.  Patient and girlfriend are comfortable with this plan.    Barriers to care at this time, including but not limited to: Patient does not have established PCP.       FINAL DIAGNOSIS  1. Alcoholic intoxication with complication (HCC)    2. Alcohol abuse

## 2024-06-03 ENCOUNTER — OFFICE VISIT (OUTPATIENT)
Dept: URGENT CARE | Facility: CLINIC | Age: 24
End: 2024-06-03
Payer: COMMERCIAL

## 2024-06-03 VITALS
DIASTOLIC BLOOD PRESSURE: 92 MMHG | OXYGEN SATURATION: 98 % | SYSTOLIC BLOOD PRESSURE: 150 MMHG | WEIGHT: 210 LBS | HEIGHT: 67 IN | BODY MASS INDEX: 32.96 KG/M2 | RESPIRATION RATE: 16 BRPM | TEMPERATURE: 98.1 F | HEART RATE: 88 BPM

## 2024-06-03 DIAGNOSIS — I10 PRIMARY HYPERTENSION: ICD-10-CM

## 2024-06-03 DIAGNOSIS — R03.0 ELEVATED BLOOD PRESSURE READING: ICD-10-CM

## 2024-06-03 PROCEDURE — 3080F DIAST BP >= 90 MM HG: CPT | Performed by: NURSE PRACTITIONER

## 2024-06-03 PROCEDURE — 99203 OFFICE O/P NEW LOW 30 MIN: CPT | Performed by: NURSE PRACTITIONER

## 2024-06-03 PROCEDURE — 3077F SYST BP >= 140 MM HG: CPT | Performed by: NURSE PRACTITIONER

## 2024-06-03 RX ORDER — AMLODIPINE BESYLATE 5 MG/1
5 TABLET ORAL DAILY
Qty: 30 TABLET | Refills: 4 | Status: SHIPPED | OUTPATIENT
Start: 2024-06-03 | End: 2024-07-03

## 2024-06-03 ASSESSMENT — FIBROSIS 4 INDEX: FIB4 SCORE: 0.71

## 2024-06-03 NOTE — PROGRESS NOTES
Date: 06/03/24        Chief Complaint   Patient presents with    Blood Pressure Problem     X2wks, High BP, temporary on and off blurry vision, faint headaches, has family hx of high bp,         History of Present Illness: 23 y.o.  male presents to clinic with concerns in regards to multiple readings of elevated blood pressure.  Patient states he does have a family history of his father having elevated blood pressure.  Unfortunately he also lost his father to a cardiac event about a year and a half ago.  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  He has had his blood pressure taken by the triage nurse.  Past 3 blood pressure is 158/96 155/93.  He states he does have slight intermittent headaches although not severe.  He states since started working at [employer] he does get intermittent blurred vision secondary to the overhead lights but no blurred vision associated with elevated blood pressure.  He does have a history of astigmatism.  He does admit that he may have a high sodium intake.  He does have a history of a bout of atrial fibrillation secondary to increased caffeine intake.  This was 2 years ago.  He has since stopped taking metoprolol after clearance from a cardiologist.  Currently he denies any shortness of breath chest pain no dark-colored urine.  He does not currently have a primary care provider.    ROS:    No severe shortness of breath   No Cardiac like chest pain, as discussed   As otherwise stated in HPI    Medical/SX/ Social History:  Reviewed per chart    Pertinent Medications:    Current Outpatient Medications on File Prior to Visit   Medication Sig Dispense Refill    metoprolol tartrate (LOPRESSOR) 25 MG Tab Take 0.5 Tablets by mouth 2 times a day. (Patient not taking: Reported on 6/3/2024) 3 Tablet 0     No current facility-administered medications on file prior to visit.        Allergies:    Patient has no known allergies.     Problem list, medications, and  allergies reviewed by myself today in Epic     Physical Exam:    Vitals:    06/03/24 0822   BP: (!) 150/92   Pulse: 88   Resp: 16   Temp: 36.7 °C (98.1 °F)   SpO2: 98%             Physical Exam  Constitutional:       General: He is awake.      Appearance: Normal appearance. He is not ill-appearing, toxic-appearing or diaphoretic.   HENT:      Head: Normocephalic and atraumatic.      Right Ear: Tympanic membrane, ear canal and external ear normal.      Left Ear: Tympanic membrane, ear canal and external ear normal.   Eyes:      General: Lids are normal. Gaze aligned appropriately. No allergic shiner or scleral icterus.     Extraocular Movements: Extraocular movements intact.      Conjunctiva/sclera: Conjunctivae normal.      Pupils: Pupils are equal, round, and reactive to light.   Cardiovascular:      Rate and Rhythm: Normal rate and regular rhythm.      Pulses:           Radial pulses are 2+ on the right side and 2+ on the left side.      Heart sounds: Normal heart sounds.   Pulmonary:      Effort: Pulmonary effort is normal.      Breath sounds: Normal breath sounds and air entry. No decreased breath sounds, wheezing, rhonchi or rales.   Musculoskeletal:      Right lower leg: No edema.      Left lower leg: No edema.   Lymphadenopathy:      Cervical: No cervical adenopathy.   Skin:     General: Skin is warm.      Capillary Refill: Capillary refill takes less than 2 seconds.      Coloration: Skin is not cyanotic or pale.   Neurological:      Mental Status: He is alert and oriented to person, place, and time.      Gait: Gait is intact.   Psychiatric:         Behavior: Behavior normal. Behavior is cooperative.              Medical Decision making and plan :  I personally reviewed prior external notes and test results pertinent to today's visit. Pt is clinically stable at today's acute urgent care visit.  Patient appears nontoxic with no acute distress noted. Appropriate for outpatient care at this time.      Pleasant  23 y.o. male presented clinic with concerns secondary to elevated blood pressure.  In clinic today patient's blood pressure is 150/92.  3 elevated readings with SBP being 150.  Discussed with patient due to unknown baseline labs suction for blood pressure medication as limited.  Discussed obtaining laboratory workup patient recently declined as he would like to do this with his new PCP.  I will start patient on Norvasc due to unknown kidney function.  Will start at 5 mg.  Discussed lifestyle modifications to decrease sodium and DASH diet.  Advised signs and symptoms of hypotension.  Move slowly.  Increase fluids.  Referral to PCP placed.    1. Primary hypertension    - amLODIPine (NORVASC) 5 MG Tab; Take 1 Tablet by mouth every day for 30 days.  Dispense: 30 Tablet; Refill: 4  - Referral to establish with PCP    2. Elevated blood pressure reading    - Referral to establish with PCP       Shared decision-making was utilized with patient for treatment plan. Medication discussed included indication for use and the potential benefits and side effects. Education was provided regarding the aforementioned assessments.  Differential Diagnosis, natural history, and supportive care discussed. All of the patient's questions were answered to their satisfaction at the time of discharge. Patient was encouraged to monitor symptoms closely. Those signs and symptoms which would warrant concern and mandate seeking a higher level of service through the emergency department discussed at length.  Patient stated agreement and understanding of this plan of care.    Disposition:  Home in stable condition       Voice Recognition Disclaimer:  Portions of this document were created using voice recognition software. The software does have a chance of producing errors of grammar and possibly content. I have made every reasonable attempt to correct obvious errors, but there may be errors of grammar and possibly content that I did not discover  before finalizing the documentation.    JORGE Rao.

## 2024-09-18 ENCOUNTER — TELEPHONE (OUTPATIENT)
Dept: HEALTH INFORMATION MANAGEMENT | Facility: OTHER | Age: 24
End: 2024-09-18

## 2024-12-19 ENCOUNTER — APPOINTMENT (OUTPATIENT)
Dept: MEDICAL GROUP | Facility: IMAGING CENTER | Age: 24
End: 2024-12-19
Attending: NURSE PRACTITIONER
Payer: COMMERCIAL

## 2024-12-19 VITALS
RESPIRATION RATE: 16 BRPM | SYSTOLIC BLOOD PRESSURE: 136 MMHG | OXYGEN SATURATION: 97 % | BODY MASS INDEX: 36.13 KG/M2 | HEIGHT: 67 IN | DIASTOLIC BLOOD PRESSURE: 86 MMHG | WEIGHT: 230.2 LBS | TEMPERATURE: 98.7 F | HEART RATE: 75 BPM

## 2024-12-19 DIAGNOSIS — Z11.59 NEED FOR HEPATITIS C SCREENING TEST: ICD-10-CM

## 2024-12-19 DIAGNOSIS — I10 PRIMARY HYPERTENSION: ICD-10-CM

## 2024-12-19 DIAGNOSIS — Z13.21 ENCOUNTER FOR VITAMIN DEFICIENCY SCREENING: ICD-10-CM

## 2024-12-19 DIAGNOSIS — F33.1 MODERATE EPISODE OF RECURRENT MAJOR DEPRESSIVE DISORDER (HCC): ICD-10-CM

## 2024-12-19 DIAGNOSIS — Z11.4 SCREENING FOR HIV (HUMAN IMMUNODEFICIENCY VIRUS): ICD-10-CM

## 2024-12-19 DIAGNOSIS — E66.9 OBESITY (BMI 30-39.9): ICD-10-CM

## 2024-12-19 DIAGNOSIS — F41.1 GENERALIZED ANXIETY DISORDER: ICD-10-CM

## 2024-12-19 DIAGNOSIS — Z78.9 ALCOHOL USE: ICD-10-CM

## 2024-12-19 DIAGNOSIS — Z23 NEED FOR VACCINATION: ICD-10-CM

## 2024-12-19 DIAGNOSIS — Z00.00 HEALTHCARE MAINTENANCE: ICD-10-CM

## 2024-12-19 PROBLEM — F10.90 ALCOHOL USE: Status: ACTIVE | Noted: 2024-12-19

## 2024-12-19 PROCEDURE — 90715 TDAP VACCINE 7 YRS/> IM: CPT

## 2024-12-19 PROCEDURE — 90471 IMMUNIZATION ADMIN: CPT

## 2024-12-19 PROCEDURE — 99215 OFFICE O/P EST HI 40 MIN: CPT | Mod: 25 | Performed by: STUDENT IN AN ORGANIZED HEALTH CARE EDUCATION/TRAINING PROGRAM

## 2024-12-19 RX ORDER — AMLODIPINE BESYLATE 5 MG/1
5 TABLET ORAL DAILY
Qty: 60 TABLET | Refills: 1 | Status: SHIPPED | OUTPATIENT
Start: 2024-12-19

## 2024-12-19 RX ORDER — SERTRALINE HYDROCHLORIDE 25 MG/1
25 TABLET, FILM COATED ORAL DAILY
Qty: 45 TABLET | Refills: 3 | Status: SHIPPED | OUTPATIENT
Start: 2024-12-19

## 2024-12-19 ASSESSMENT — PATIENT HEALTH QUESTIONNAIRE - PHQ9
CLINICAL INTERPRETATION OF PHQ2 SCORE: 4
SUM OF ALL RESPONSES TO PHQ QUESTIONS 1-9: 13
5. POOR APPETITE OR OVEREATING: 1 - SEVERAL DAYS

## 2024-12-19 ASSESSMENT — ANXIETY QUESTIONNAIRES
6. BECOMING EASILY ANNOYED OR IRRITABLE: SEVERAL DAYS
4. TROUBLE RELAXING: MORE THAN HALF THE DAYS
5. BEING SO RESTLESS THAT IT IS HARD TO SIT STILL: SEVERAL DAYS
3. WORRYING TOO MUCH ABOUT DIFFERENT THINGS: SEVERAL DAYS
7. FEELING AFRAID AS IF SOMETHING AWFUL MIGHT HAPPEN: MORE THAN HALF THE DAYS
2. NOT BEING ABLE TO STOP OR CONTROL WORRYING: MORE THAN HALF THE DAYS
1. FEELING NERVOUS, ANXIOUS, OR ON EDGE: NEARLY EVERY DAY
GAD7 TOTAL SCORE: 12

## 2024-12-19 NOTE — PROGRESS NOTES
Subjective:       CC:   Chief Complaint   Patient presents with    [facility]     Has high blood pressure    Anxiety       HPI:     Verbal consent was acquired by the patient to use [employer] ambient listening note generation during this visit Mikki Stearns is a 24 y.o. male is new to me and is here today to [facility]. Previous PCP was none.  Pt would like to discuss the following today    History of Present Illness  The patient is a 24-year-old male who presents to establish care.    HTN  He has a documented history of hypertension, with a significant familial predisposition to the condition. He does not regularly monitor his blood pressure at home. His blood pressure was recorded as 168 mmHg during a routine check at his workplace, a hospital. 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 He does not experience any angina, palpitations, dizziness,  or dyspnea associated with his hypertension. He was previously prescribed amlodipine 5 mg for a duration of 3 months, but he has been noncompliant with this medication for the past 2 months. He has a lifelong history of elevated blood pressure readings, dating back to his high school years.    Anxiety/depression  He has a longstanding history of anxiety, first noted in middle school, with initial symptoms including hyperhidrosis and dyspnea. Over time, his anxiety has progressively worsened, often coinciding with periods of major depressive episodes. He has previously engaged in psychotherapy, which he found  not beneficial, but he is currently considering pharmacotherapy due to the persistent nature of his anxiety. He reports that his depression is episodic, but when it occurs, it is severe enough to disrupt his daily activities. He has attempted various coping strategies, including dietary modifications and regular physical activity, but these have not significantly  alleviated his symptoms. He denies any current suicidal ideation or intent to harm others, but he admits to past thoughts of self-harm. He has never attempted suicide.      SOCIAL HISTORY  - Does not smoke or vape  - Uses nicotine patches similar to chewing tobacco  - Does not use drugs, including marijuana  - Drinks alcohol twice a week, consuming between four and five drinks, usually on the weekends      Depression Screening    Little interest or pleasure in doing things?  2 - more than half the days   Feeling down, depressed , or hopeless? 2 - more than half the days   Trouble falling or staying asleep, or sleeping too much?  1 - several days   Feeling tired or having little energy?  2 - more than half the days   Poor appetite or overeating?  1 - several days   Feeling bad about yourself - or that you are a failure or have let yourself or your family down? 3 - nearly every day   Trouble concentrating on things, such as reading the newspaper or watching television? 1 - several days   Moving or speaking so slowly that other people could have noticed.  Or the opposite - being so fidgety or restless that you have been moving around a lot more than usual?  1 - several days   Thoughts that you would be better off dead, or of hurting yourself?  0 - not at all   Patient Health Questionnaire Score: 13       If depressive symptoms identified deferred to follow up visit unless specifically addressed in assesment and plan.    Interpretation of PHQ-9 Total Score   Score Severity   1-4 No Depression   5-9 Mild Depression   10-14 Moderate Depression   15-19 Moderately Severe Depression   20-27 Severe Depression     ABDON-7 Questionnaire    Feeling nervous, anxious, or on edge: Nearly every day  Not being able to sop or control worrying: More than half the days  Worrying too much about different things: Several days  Trouble relaxing: More than half the days  Being so restless that it's hard to sit still: Several days  Becoming  easily annoyed or irritable: Several days  Feeling afraid as if something awful might happen: More than half the days  Total: 12    Interpretation of ABDON 7 Total Score   Score Severity :  0-4 No Anxiety   5-9 Mild Anxiety  10-14 Moderate Anxiety  15-21 Severe Anxiety       ROS:   See HPI    Patient Active Problem List    Diagnosis Date Noted    Primary hypertension 12/19/2024    Obesity (BMI 30-39.9) 12/19/2024    Generalized anxiety disorder 12/19/2024    Moderate episode of recurrent major depressive disorder (HCC) 12/19/2024    Alcohol use 12/19/2024       History reviewed. No pertinent past medical history.    Current Outpatient Medications   Medication Sig Dispense Refill    amLODIPine (NORVASC) 5 MG Tab Take 1 Tablet by mouth every day. 60 Tablet 1    sertraline (ZOLOFT) 25 MG tablet Take 1 Tablet by mouth every day. 45 Tablet 3     No current facility-administered medications for this visit.       Allergies as of 12/19/2024    (No Known Allergies)       Social History     Socioeconomic History    Marital status: Single     Spouse name: Not on file    Number of children: Not on file    Years of education: Not on file    Highest education level: Not on file   Occupational History    Not on file   Tobacco Use    Smoking status: Never    Smokeless tobacco: Never   Vaping Use    Vaping status: Never Used   Substance and Sexual Activity    Alcohol use: Not Currently     Alcohol/week: 2.4 oz     Types: 4 Standard drinks or equivalent per week    Drug use: Not Currently    Sexual activity: Yes     Partners: Female   Other Topics Concern    Not on file   Social History Narrative    Not on file     Social Drivers of Health     Financial Resource Strain: Not on file   Food Insecurity: Not on file   Transportation Needs: Not on file   Physical Activity: Not on file   Stress: Not on file   Social Connections: Not on file   Intimate Partner Violence: Not on file   Housing Stability: Not on file       Family History  "  Problem Relation Age of Onset    Hypertension Mother     Hypertension Father     Hypertension Maternal Uncle     Hypertension Maternal Grandmother     Diabetes Paternal Grandmother        History reviewed. No pertinent surgical history.        Objective:     /86 (BP Location: Left arm, Patient Position: Sitting, BP Cuff Size: Adult)   Pulse 75   Temp 37.1 °C (98.7 °F) (Temporal)   Resp 16   Ht 1.702 m (5' 7\")   Wt 104 kg (230 lb 3.2 oz)   SpO2 97%   BMI 36.05 kg/m²     Physical Exam  Vitals reviewed.   Constitutional:       General: He is not in acute distress.     Appearance: Normal appearance.   HENT:      Head: Normocephalic and atraumatic.   Eyes:      General: No scleral icterus.  Neck:      Thyroid: No thyroid mass or thyromegaly.   Cardiovascular:      Rate and Rhythm: Normal rate and regular rhythm.      Pulses: Normal pulses.      Heart sounds: Normal heart sounds. No murmur heard.  Pulmonary:      Effort: Pulmonary effort is normal. No respiratory distress.      Breath sounds: Normal breath sounds. No wheezing.   Abdominal:      General: Bowel sounds are normal. There is no distension.      Palpations: Abdomen is soft. There is no mass.      Tenderness: There is no abdominal tenderness.   Musculoskeletal:         General: No swelling. Normal range of motion.      Cervical back: Normal range of motion and neck supple. No tenderness.   Lymphadenopathy:      Cervical: No cervical adenopathy.   Skin:     General: Skin is warm and dry.      Coloration: Skin is not jaundiced.      Findings: No bruising.   Neurological:      General: No focal deficit present.      Mental Status: He is alert and oriented to person, place, and time.      Gait: Gait normal.   Psychiatric:         Mood and Affect: Mood normal.         Behavior: Behavior normal.         Thought Content: Thought content normal.         Judgment: Judgment normal.              Assessment and Plan:     Assessment & Plan       1. Primary " hypertension  Chronic, stable today.  His blood pressure today is 136/86, which is not significantly elevated but still above the ideal range of less than 120/80.  Patient reports that his blood pressure is unusually low today.  His blood pressure tends to be much higher than this.  He will be initiated on amlodipine 5 mg daily which he was taking before. He is advised to maintain a diet low in fat, sugar, and salt, and to engage in regular physical activity for at least 30 minutes daily. He is also advised to avoid his alcohol consumption. A comprehensive blood work panel will be ordered to assess kidney function, liver function, red blood cells, white blood cells, platelets, cholesterol, thyroid, vitamin D, hepatitis C, and HIV. An EKG was performed today and was sinus rhythm without abnormality. He is encouraged to purchase a home blood pressure monitor and record his readings daily or every other day. If his blood pressure remains uncontrolled, adjustments to his medication regimen will be considered.  Recommend to return to the clinic in 5 weeks or sooner if needed.  - amLODIPine (NORVASC) 5 MG Tab; Take 1 Tablet by mouth every day.  Dispense: 60 Tablet; Refill: 1  - TSH WITH REFLEX TO FT4; Future  - Comp Metabolic Panel; Future  - CBC WITH DIFFERENTIAL; Future  -EKG -  CLINIC PERFORMED    2. Obesity (BMI 30-39.9)  Chronic.  Discussed lifestyle modifications to help lose weight and help prevent disease such as diabetes and heart disease.  Screening labs ordered to rule out underlying comorbidity.  Improve your eating habits. Eat a variety of foods from each food group: include whole grains, vegetables, fruits, low fat or fat free dairy, and protein foods, mostly plant based. Limit foods high in fat, sugar, calories, and sodium. Eat slowly, and don't do anything else, such as watch TV, while you are eating. Pay attention to portion sizes. Put your food on a smaller plate, follow MyPlate guidelines:vegetables  make up the largest section, followed by grains. Together, fruits and vegetables fill half the plate, while proteins and grains fill the other half.  Regular activity can help you feel better, have more energy, and burn more calories. If you haven't been active, start slowly. Start with at least 30 minutes of moderate activity on most days of the week; then gradually increase the amount of activity. Try for 60 or 90 minutes a day, at least 5 days a week.   - HEMOGLOBIN A1C; Future  - TSH WITH REFLEX TO FT4; Future  - Comp Metabolic Panel; Future  - CBC WITH DIFFERENTIAL; Future  - Lipid Profile; Future    3. Generalized anxiety disorder  4. Moderate episode of recurrent major depressive disorder (HCC)  Chronic, uncontrolled.  Patient would like to trial pharmacological interventions.  He tried psychotherapy in the past but was not beneficial.  Will trial sertraline 25 mg daily.  He was informed about the potential side effects of sertraline, including fatigue, drowsiness, dizziness, changes in libido, and rare effect of suicidal ideations. He is advised to take the medication in the morning, but if it induces fatigue, he may switch to nighttime administration. He is also advised to maintain adequate hydration throughout the day. He will be started on sertraline 25 mg daily. If he experiences any suicidal thoughts while on sertraline, he is instructed to discontinue the medication immediately and seek emergency medical attention. If there is no improvement in his symptoms after 5 weeks, the dosage of sertraline may be increased to 50 mg.  - sertraline (ZOLOFT) 25 MG tablet; Take 1 Tablet by mouth every day.  Dispense: 45 Tablet; Refill: 3    5. Alcohol use  Chronic.  Recommend avoiding alcohol.    6. Need for vaccination  - Tdap Vaccine =>8YO IM    7. Need for hepatitis C screening test  - HEP C VIRUS ANTIBODY; Future    8. Screening for HIV (human immunodeficiency virus)  - HIV AG/AB COMBO ASSAY SCREENING;  Future    9. Encounter for vitamin deficiency screening  - VITAMIN D 25-HYDROXY    10. Healthcare maintenance  Patient received Tdap today.  He declined flu shot.  He is up-to-date with other immunizations.    Diagnosis and treatment plan explained to pt. Counseled pt on new medication(s) and potential side effects. Pt agreed with treatment plan and verbalized understanding.     A total of 40 minutes was spent today reviewing chart, reviewing EKG, assessing and examining the patient, ordering tests, and documenting. None of which was spent performing separately billing procedures or ancillary services.     Return in about 5 weeks (around 1/23/2025) for HTN, depression, anxiety.       Please note that this dictation was created using voice recognition software. I have made every reasonable attempt to correct obvious errors, but I expect that there are errors of grammar and possibly content that I did not discover before finalizing the note.    Jeanette Cox PA-C  [facility]

## 2024-12-19 NOTE — PATIENT INSTRUCTIONS
Thank you for choosing [facility]. It was a pleasure meeting you today.     Take care!  Jeanette FarmerP[facility]

## 2025-01-08 ENCOUNTER — TELEPHONE (OUTPATIENT)
Dept: MEDICAL GROUP | Facility: IMAGING CENTER | Age: 25
End: 2025-01-08
Payer: COMMERCIAL

## 2025-01-08 NOTE — TELEPHONE ENCOUNTER
LVM to reschedule a patient's appointment on 01/28/2025 with Jeanette Cox . Jeanette Cox  will be out-of-office at that time. Patient was instructed to call [phone] or use [employer] application to reschedule at their earliest convenience.

## 2025-01-14 ENCOUNTER — TELEPHONE (OUTPATIENT)
Dept: MEDICAL GROUP | Facility: IMAGING CENTER | Age: 25
End: 2025-01-14
Payer: COMMERCIAL

## 2025-01-14 NOTE — TELEPHONE ENCOUNTER
LVM to reschedule a patient's appointment on 01/28/2025 with Jeanette Cox . Jeanette Cox  will be out-of-office at that time. Patient was instructed to call [phone] or use [employer] application to reschedule at their earliest convenience.

## 2025-01-28 ENCOUNTER — APPOINTMENT (OUTPATIENT)
Dept: MEDICAL GROUP | Facility: IMAGING CENTER | Age: 25
End: 2025-01-28
Payer: COMMERCIAL

## 2025-02-11 ENCOUNTER — APPOINTMENT (OUTPATIENT)
Dept: MEDICAL GROUP | Facility: IMAGING CENTER | Age: 25
End: 2025-02-11
Payer: COMMERCIAL

## 2025-03-25 ENCOUNTER — APPOINTMENT (OUTPATIENT)
Dept: MEDICAL GROUP | Facility: IMAGING CENTER | Age: 25
End: 2025-03-25
Payer: COMMERCIAL

## 2025-03-25 VITALS
WEIGHT: 226.8 LBS | SYSTOLIC BLOOD PRESSURE: 118 MMHG | BODY MASS INDEX: 35.6 KG/M2 | DIASTOLIC BLOOD PRESSURE: 70 MMHG | OXYGEN SATURATION: 98 % | HEART RATE: 70 BPM | RESPIRATION RATE: 14 BRPM | TEMPERATURE: 98.3 F | HEIGHT: 67 IN

## 2025-03-25 DIAGNOSIS — M87.051 AVASCULAR NECROSIS OF RIGHT FEMUR (HCC): ICD-10-CM

## 2025-03-25 DIAGNOSIS — I10 PRIMARY HYPERTENSION: ICD-10-CM

## 2025-03-25 DIAGNOSIS — F41.1 GENERALIZED ANXIETY DISORDER: ICD-10-CM

## 2025-03-25 DIAGNOSIS — R10.13 EPIGASTRIC PAIN: ICD-10-CM

## 2025-03-25 DIAGNOSIS — M87.052 AVASCULAR NECROSIS OF LEFT FEMUR (HCC): ICD-10-CM

## 2025-03-25 DIAGNOSIS — F33.1 MODERATE EPISODE OF RECURRENT MAJOR DEPRESSIVE DISORDER (HCC): ICD-10-CM

## 2025-03-25 PROCEDURE — 99214 OFFICE O/P EST MOD 30 MIN: CPT | Performed by: STUDENT IN AN ORGANIZED HEALTH CARE EDUCATION/TRAINING PROGRAM

## 2025-03-25 PROCEDURE — 3078F DIAST BP <80 MM HG: CPT | Performed by: STUDENT IN AN ORGANIZED HEALTH CARE EDUCATION/TRAINING PROGRAM

## 2025-03-25 PROCEDURE — 3074F SYST BP LT 130 MM HG: CPT | Performed by: STUDENT IN AN ORGANIZED HEALTH CARE EDUCATION/TRAINING PROGRAM

## 2025-03-25 RX ORDER — AMLODIPINE BESYLATE 5 MG/1
5 TABLET ORAL DAILY
Qty: 90 TABLET | Refills: 3 | Status: SHIPPED | OUTPATIENT
Start: 2025-03-25

## 2025-03-25 ASSESSMENT — ANXIETY QUESTIONNAIRES
6. BECOMING EASILY ANNOYED OR IRRITABLE: NEARLY EVERY DAY
5. BEING SO RESTLESS THAT IT IS HARD TO SIT STILL: SEVERAL DAYS
4. TROUBLE RELAXING: NEARLY EVERY DAY
1. FEELING NERVOUS, ANXIOUS, OR ON EDGE: NEARLY EVERY DAY
2. NOT BEING ABLE TO STOP OR CONTROL WORRYING: NEARLY EVERY DAY
GAD7 TOTAL SCORE: 19
3. WORRYING TOO MUCH ABOUT DIFFERENT THINGS: NEARLY EVERY DAY
7. FEELING AFRAID AS IF SOMETHING AWFUL MIGHT HAPPEN: NEARLY EVERY DAY

## 2025-03-25 ASSESSMENT — PATIENT HEALTH QUESTIONNAIRE - PHQ9
CLINICAL INTERPRETATION OF PHQ2 SCORE: 2
SUM OF ALL RESPONSES TO PHQ QUESTIONS 1-9: 9
5. POOR APPETITE OR OVEREATING: 1 - SEVERAL DAYS

## 2025-03-25 NOTE — PROGRESS NOTES
Subjective:     CC:   Chief Complaint   Patient presents with    Follow-Up     HTN, anxiety     Other     ER at [facility] 3/22/25 with stomach pain       HPI:     Verbal consent was acquired by the patient to use TOMAS Copilot ambient listening note generation during this visit Yes      razia López, 24 y.o., male,  presents today to discuss:     History of Present Illness    Anxiety and depression follow-up  The patient reports a reduction in anxiety symptoms following a month-long course of sertraline. However, he inadvertently discarded the medication bottle and has been uncertain about the process of obtaining a refill. Consequently, he has not taken the medication for approximately one month due to rescheduling of appointments. He reports an increase in alcohol consumption over the past 2 to 3 weeks, which he attributes to his anxiety. He denies any thoughts of self-harm or harm to others.    ABDON-7 Questionnaire    Feeling nervous, anxious, or on edge: Nearly every day  Not being able to sop or control worrying: Nearly every day  Worrying too much about different things: Nearly every day  Trouble relaxing: Nearly every day  Being so restless that it's hard to sit still: Several days  Becoming easily annoyed or irritable: Nearly every day  Feeling afraid as if something awful might happen: Nearly every day  Total: 19    Interpretation of ABDON 7 Total Score   Score Severity :  0-4 No Anxiety   5-9 Mild Anxiety  10-14 Moderate Anxiety  15-21 Severe Anxiety     Depression Screening    Little interest or pleasure in doing things?  1 - several days   Feeling down, depressed , or hopeless? 1 - several days   Trouble falling or staying asleep, or sleeping too much?  1 - several days   Feeling tired or having little energy?  1 - several days   Poor appetite or overeating?  1 - several days   Feeling bad about yourself - or that you are a failure or have let yourself or your family down? 2 - more than half  the days   Trouble concentrating on things, such as reading the newspaper or watching television? 1 - several days   Moving or speaking so slowly that other people could have noticed.  Or the opposite - being so fidgety or restless that you have been moving around a lot more than usual?  1 - several days   Thoughts that you would be better off dead, or of hurting yourself?  0 - not at all   Patient Health Questionnaire Score: 9       If depressive symptoms identified deferred to follow up visit unless specifically addressed in assesment and plan.    Interpretation of PHQ-9 Total Score   Score Severity   1-4 No Depression   5-9 Mild Depression   10-14 Moderate Depression   15-19 Moderately Severe Depression   20-27 Severe Depression     HTN follow up   The patient had discontinued his antihypertensive medication due to work-related stress but resumed it on Monday after experiencing elevated blood pressure during an emergency room visit on Saturday. His blood pressure was significantly elevated during the ER visit. He has since resumed his amlodipine regimen without any changes to the medication. Denies chest pain, dizziness, and palpitations.     Epigastric pain  The patient sought emergency care on Saturday due to severe epigastric pain, which he initially suspected to be a myocardial infarction. The pain was described as sharp and stabbing, reminiscent of previous episodes of random abdominal pain, but significantly more intense. He did not experience hematemesis or hemoptysis. Diagnostic tests included a CT scan and blood work. He was prescribed hydrocodone with acetaminophen and pantoprazole but has not yet filled these prescriptions. The pain has subsided over the past 2 days. He admits to chewing tobacco and acknowledges its potential contribution to his symptoms. He has not completed the blood work ordered at previous appt.     AVN of hip  Avascular necrosis was incidentally discovered on a CT scan. He does  "not report any hip pain but notes a general sense of body ache, particularly after his knee surgery when he was informed of a potential development of arthritis. He experiences hand pain in cold weather and occasional hip discomfort after prolonged periods of work. He has a history of playing football for 16 years and other sports such as baseball, basketball, and snowboarding, but does not recall any specific hip injuries or trauma.         ROS:  See HPI    Medications, allergies, past medical history, family history, surgical history, and social history documented in chart and reviewed by me.       Objective:   Exam:  /70 (BP Location: Right arm, Patient Position: Sitting, BP Cuff Size: Large adult)   Pulse 70   Temp 36.8 °C (98.3 °F) (Temporal)   Resp 14   Ht 1.702 m (5' 7\")   Wt 103 kg (226 lb 12.8 oz)   SpO2 98%   BMI 35.52 kg/m²      General: In no acute distress. Normal appearance.   Head:   Atraumatic, normocephalic.   Neck: Supple without lymphadenopathy.   Pulmonary: Normal effort, clear to auscultation bilaterally, no wheezes, rhonchi, or rales  Cardiovascular:   Regular rate and rhythm. No murmurs, rubs, or gallops.  Musculoskeletal:  Normal ROM. No edema.    Skin: No visible rashes or lesions.  Neurological: Alert and oriented to person, place, and time. Gait normal.   Psychiatric: Normal mood and affect. Calm and friendly behavior. Speech clear. Normal judgement and insight.         Assessment & Plan:       Assessment & Plan     1. Primary hypertension  Chronic, established condition. Well controlled.  Recommend to continue with amlodipine 5 mg daily.  Recommend follow-up in 3 months or sooner if needed.  - amLODIPine (NORVASC) 5 MG Tab; Take 1 Tablet by mouth every day.  Dispense: 90 Tablet; Refill: 3    2. Generalized anxiety disorder  3. Moderate episode of recurrent major depressive disorder (HCC)  Chronic, established conditions. Unstable.  Patient is advised to resume sertraline 25 " mg daily.  Recommend follow-up in 3 months or sooner if needed.    4. Epigastric pain  Acute.  Stable.  Patient was evaluated at the emergency room at [facility].  Patient brought a copy of the discharge summary.  Records reviewed today.  CT scan of abdomen and pelvis was unremarkable.  CBC, CMP, and lipase were normal.  Patient was referred to GI for EGD consult.  Recommend to schedule consultation with GI.  If he experiences severe pain, hematemesis, hemoptysis, fever, melena, or  hematochezia recommend going back to the emergency room.    5. Avascular necrosis of left femur (HCC)  6. Avascular necrosis of right femur (HCC)  Acute.  Stable.  Incidental finding on CT scan at [facility].  X-rays ordered and referred to Ortho for further evaluation and management.  - DX-HIP-BILATERAL-W/O PELVIS-2 VIEWS; Future  - Referral to Orthopedics       Diagnosis and treatment plan explained to pt. Pt agreed with treatment plan and verbalized understanding.    Return in about 3 months (around 6/25/2025) for HTN/depresion/anxiety.     Please note that this dictation was created using voice recognition software. I have made every reasonable attempt to correct obvious errors, but I expect that there are errors of grammar and possibly content that I did not discover before finalizing the note.    Jeanette Cox PA-C  [facility]

## 2025-03-27 NOTE — Clinical Note
REFERRAL APPROVAL NOTICE         Sent on March 27, 2025                   Jinny Desai  [address]  [address]  [place] [id number]                   Dear Mr. Desai,    After a careful review of the medical information and benefit coverage, [facility] has processed your referral. See below for additional details.    If applicable, you must be actively enrolled with your insurance for coverage of the authorized service. If you have any questions regarding your coverage, please contact your insurance directly.    REFERRAL INFORMATION   Referral #:  [id number]  Referred-To Department    Referred-By Provider:  Orthopedics    Jeanette Cox P.A.-C.   [facility]      [address] [place] [id number]  [phone] [address] [place] [address]  [place] [id number]  [phone]    Referral Start Date:  03/25/2025  Referral End Date:   03/25/2026             SCHEDULING  If you do not already have an appointment, please call [phone] to make an appointment.     MORE INFORMATION  If you do not already have a [employer] account, sign up at: [email or web address]  You can access your medical information, make appointments, see lab results, billing information, and more.  If you have questions regarding this referral, please contact  the [facility] Referrals department at:             [phone]. Monday - Friday 8:00AM - 5:00PM.     Sincerely,    [facility]

## 2025-05-26 ENCOUNTER — HOSPITAL ENCOUNTER (EMERGENCY)
Facility: MEDICAL CENTER | Age: 25
End: 2025-05-26
Attending: EMERGENCY MEDICINE
Payer: COMMERCIAL

## 2025-05-26 VITALS
OXYGEN SATURATION: 94 % | SYSTOLIC BLOOD PRESSURE: 138 MMHG | HEIGHT: 67 IN | BODY MASS INDEX: 34.53 KG/M2 | WEIGHT: 220 LBS | DIASTOLIC BLOOD PRESSURE: 83 MMHG | TEMPERATURE: 98.2 F | HEART RATE: 74 BPM | RESPIRATION RATE: 16 BRPM

## 2025-05-26 DIAGNOSIS — R45.851 SUICIDAL IDEATION: Primary | ICD-10-CM

## 2025-05-26 LAB
ALBUMIN SERPL BCP-MCNC: 4.4 G/DL (ref 3.2–4.9)
ALBUMIN/GLOB SERPL: 1.5 G/DL
ALP SERPL-CCNC: 53 U/L (ref 30–99)
ALT SERPL-CCNC: 36 U/L (ref 2–50)
ANION GAP SERPL CALC-SCNC: 14 MMOL/L (ref 7–16)
ANISOCYTOSIS BLD QL SMEAR: ABNORMAL
APAP SERPL-MCNC: <5 UG/ML (ref 10–30)
AST SERPL-CCNC: 32 U/L (ref 12–45)
BASOPHILS # BLD AUTO: 0 % (ref 0–1.8)
BASOPHILS # BLD: 0 K/UL (ref 0–0.12)
BILIRUB SERPL-MCNC: <0.2 MG/DL (ref 0.1–1.5)
BUN SERPL-MCNC: 10 MG/DL (ref 8–22)
CALCIUM ALBUM COR SERPL-MCNC: 8.5 MG/DL (ref 8.5–10.5)
CALCIUM SERPL-MCNC: 8.8 MG/DL (ref 8.5–10.5)
CHLORIDE SERPL-SCNC: 107 MMOL/L (ref 96–112)
CO2 SERPL-SCNC: 21 MMOL/L (ref 20–33)
CREAT SERPL-MCNC: 1.16 MG/DL (ref 0.5–1.4)
EKG IMPRESSION: NORMAL
EOSINOPHIL # BLD AUTO: 0 K/UL (ref 0–0.51)
EOSINOPHIL NFR BLD: 0 % (ref 0–6.9)
ERYTHROCYTE [DISTWIDTH] IN BLOOD BY AUTOMATED COUNT: 43.4 FL (ref 35.9–50)
ETHANOL BLD-MCNC: 121 MG/DL
GFR SERPLBLD CREATININE-BSD FMLA CKD-EPI: 89 ML/MIN/1.73 M 2
GLOBULIN SER CALC-MCNC: 2.9 G/DL (ref 1.9–3.5)
GLUCOSE SERPL-MCNC: 127 MG/DL (ref 65–99)
HCT VFR BLD AUTO: 46.6 % (ref 42–52)
HGB BLD-MCNC: 16 G/DL (ref 14–18)
LYMPHOCYTES # BLD AUTO: 2.87 K/UL (ref 1–4.8)
LYMPHOCYTES NFR BLD: 52.2 % (ref 22–41)
MANUAL DIFF BLD: NORMAL
MCH RBC QN AUTO: 30 PG (ref 27–33)
MCHC RBC AUTO-ENTMCNC: 34.3 G/DL (ref 32.3–36.5)
MCV RBC AUTO: 87.3 FL (ref 81.4–97.8)
MICROCYTES BLD QL SMEAR: ABNORMAL
MONOCYTES # BLD AUTO: 0.8 K/UL (ref 0–0.85)
MONOCYTES NFR BLD AUTO: 13.9 % (ref 0–13.4)
MORPHOLOGY BLD-IMP: NORMAL
NEUTROPHILS # BLD AUTO: 1.86 K/UL (ref 1.82–7.42)
NEUTROPHILS NFR BLD: 33.9 % (ref 44–72)
NRBC # BLD AUTO: 0 K/UL
NRBC BLD-RTO: 0 /100 WBC (ref 0–0.2)
PLATELET # BLD AUTO: 238 K/UL (ref 164–446)
PLATELET BLD QL SMEAR: NORMAL
PMV BLD AUTO: 10.8 FL (ref 9–12.9)
POTASSIUM SERPL-SCNC: 4.4 MMOL/L (ref 3.6–5.5)
PROT SERPL-MCNC: 7.3 G/DL (ref 6–8.2)
RBC # BLD AUTO: 5.34 M/UL (ref 4.7–6.1)
RBC BLD AUTO: PRESENT
SALICYLATES SERPL-MCNC: <1 MG/DL (ref 15–25)
SODIUM SERPL-SCNC: 142 MMOL/L (ref 135–145)
VARIANT LYMPHS BLD QL SMEAR: NORMAL
WBC # BLD AUTO: 5.5 K/UL (ref 4.8–10.8)

## 2025-05-26 PROCEDURE — 36415 COLL VENOUS BLD VENIPUNCTURE: CPT

## 2025-05-26 PROCEDURE — 93005 ELECTROCARDIOGRAM TRACING: CPT | Mod: TC | Performed by: EMERGENCY MEDICINE

## 2025-05-26 PROCEDURE — 85027 COMPLETE CBC AUTOMATED: CPT

## 2025-05-26 PROCEDURE — 85007 BL SMEAR W/DIFF WBC COUNT: CPT

## 2025-05-26 PROCEDURE — 82077 ASSAY SPEC XCP UR&BREATH IA: CPT

## 2025-05-26 PROCEDURE — 90791 PSYCH DIAGNOSTIC EVALUATION: CPT | Performed by: BEHAVIOR TECHNICIAN

## 2025-05-26 PROCEDURE — 302970 POC BREATHALIZER: Performed by: EMERGENCY MEDICINE

## 2025-05-26 PROCEDURE — 80143 DRUG ASSAY ACETAMINOPHEN: CPT

## 2025-05-26 PROCEDURE — 99285 EMERGENCY DEPT VISIT HI MDM: CPT

## 2025-05-26 PROCEDURE — 80179 DRUG ASSAY SALICYLATE: CPT

## 2025-05-26 PROCEDURE — 80053 COMPREHEN METABOLIC PANEL: CPT

## 2025-05-26 NOTE — CONSULTS
[facility]   TRIAGE ASSESSMENT    Name: Jinny Desai  MRN: [id number]  : 2000  Age: 24 y.o.  Date of assessment: 2025  PCP: Jeanette Cox P.A.-C.  Persons in attendance: Patient  Patient Location: [facility]    CHIEF COMPLAINT/PRESENTING ISSUE (as stated by patient): Patient is a 24-year-old cisgender male BIB EMS on L2K initiated by MOST team for SI after patient attempted suicide by taking a handful of amlodipine pills, although patient states he spit them out and did not ingest any. During assessment with LCSW, patient continues to endorse suicidal ideations with intent and plan to shoot himself with a firearm. Patient has access to firearms at home. Patient reports he did not swallow the pills because he is unsure if that is the best way to die. Patient was unable to engage in safety planning discussion due to SI, and was unable to identify any reasons for living. Patient denies engaging in any suicidal preparatory behavior. Patient expressed experiencing worsening symptoms of depression and grief related to his dad passing away 3 years ago from a heart attack, and girlfriend's dad passing away from liver failure on 2025. Patient has been drinking alcohol heavily, approximately 15 beers and 7 shots of hard liquor daily, and feels a lot of shame/guilt for his drinking. Patient expressed insight recognizing drinking alcohol worsens his mood, and is likely contributing to suicidal ideations worsening. Patient denies HI and AVH. L2K certified by EDP due to SI.     Chief Complaint   Patient presents with    Suicidal Ideation     BIB EMS after patient took a handful of amlodipine. Pt reports suicidal ideation. Per report pills were spit out and not swallowed. Legal hold initiated by MOST team at 1235. Pt reports worsening depression since death of his father and death of his girlfriend's father.         CURRENT LIVING SITUATION/SOCIAL SUPPORT/FINANCIAL  RESOURCES: Patient resides in a house with his girlfriend, girlfriend's sister, girlfriend's mother, 3 cats and 1 dog. 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 Patient identifies his biggest social supports as his girlfriend and his grandmother.    BEHAVIORAL HEALTH/SUBSTANCE USE TREATMENT HISTORY  Does patient/parent report a history of prior behavioral health/substance use treatment for patient?   No    SAFETY ASSESSMENT - SELF  Does patient acknowledge current or past symptoms of dangerousness to self or is previous history noted? yes  Does parent/significant other report patient has current or past symptoms of dangerousness to self? yes  Does presenting problem suggest symptoms of dangerousness to self? Yes:     Past Current    Suicidal Thoughts: [x]  [x]    Suicidal Plans: [x]  [x]    Suicidal Intent: [x]  [x]    Suicide Attempts: [x]  [x]    Self-Injury []  []      For any boxes checked above, provide detail: Patient endorses current suicidal ideations with intent and plan to use firearm to shoot himself. Patient attempted suicide by taking a handful of amlodipine pills earlier this evening, however spit them out.     History of suicide by family member: no  History of suicide by friend/significant other: no  Recent change in frequency/specificity/intensity of suicidal thoughts or self-harm behavior? yes - SI has been worsening since his girlfriend's father passed away on 05/20/2025.   Current access to firearms, medications, or other identified means of suicide/self-harm? yes - patient has access to firearms at home  If yes, willing to restrict access to means of suicide/self-harm? no  Protective factors present:  Strong family connections and Willing to address in treatment    SAFETY ASSESSMENT - OTHERS  Does patient acknowledge current or past symptoms of aggressive behavior or risk to others or is previous history noted? no  Does parent/significant  "other report patient has current or past symptoms of aggressive behavior or risk to others?  no  Does presenting problem suggest symptoms of dangerousness to others? No    LEGAL HISTORY  Does patient acknowledge history of arrest/half-way/detention or is previous history noted? Yes, patient has history of DUI in 2021    Crisis Safety Plan completed and copy given to patient? N\A    ABUSE/NEGLECT SCREENING  Does patient report feeling “unsafe” in his/her home, or afraid of anyone?  no  Does patient report any history of physical, sexual, or emotional abuse?  no  Does parent or significant other report any of the above? no  Is there evidence of neglect by self?  no  Is there evidence of neglect by a caregiver? no  Does the patient/parent report any history of CPS/APS/police involvement related to suspected abuse/neglect or domestic violence? no  Based on the information provided during the current assessment, is a mandated report of suspected abuse/neglect being made?  No    SUBSTANCE USE SCREENING  Yes:  Demario all substances used in the past 30 days:      Last Use Amount   [x]   Alcohol 05/25/25 15 beers and 7 shots of hard liquor   []   Marijuana     []   Heroin     []   Prescription Opioids  (used without prescription, for    recreation, or in excess of prescribed amount)     []   Other Prescription  (used without prescription, for    recreation, or in excess of prescribed amount)     []   Cocaine      []   Methamphetamine     []   \"\" drugs (ectasy, MDMA)     []   Other substances        UDS results: N/A  Breathalyzer results: N/A    What consequences does the patient associate with any of the above substance use and or addictive behaviors? Work problems or losses, Relationship problems: patient expressed that his drinking makes his girlfriend sad, Family problems: patient's family worries about patient's drinking, Health problems: patient has fear of passing away from liver failure due to alcohol use    Risk " factors for detox (check all that apply):  []  Seizures   []  Diaphoretic (sweating)   []  Tremors   []  Hallucinations   []  Increased blood pressure   []  Decreased blood pressure   []  Other   []  None      [x] Patient education on risk factors for detoxification and instructed to return to ER as needed.      MENTAL STATUS   Participation: Active verbal participation, Attentive, Engaged, and Open to feedback  Grooming: Casual and neat  Orientation: Alert and Fully Oriented  Behavior: Calm  Eye contact: Good  Mood: Depressed  Affect: Blunted and Sad  Thought process: Linear and goal-directed  Thought content: Within normal limits  Speech: Rate within normal limits and Volume within normal limits  Perception: Within normal limits  Memory:  No gross evidence of memory deficits  Insight: Adequate  Judgment:  Adequate  Other:    Collateral information:   Source:  [] Significant other present in person:   [] Significant other by telephone  [] [facility]   [x] [facility] Nursing Staff  [x] [facility]  [] Other:     [] Unable to complete full assessment due to:  [] Acute intoxication  [] Patient declined to participate/engage  [] Patient verbally unresponsive  [] Significant cognitive deficits  [] Significant perceptual distortions or behavioral disorganization  [] Other:      CLINICAL IMPRESSIONS:  Primary:  Suicidal Ideations  Secondary:  Alcohol Use Disorder, severe, active    R/O substance-induced mood disorder vs. Major depressive disorder, recurrent, severe without psychotic features     IDENTIFIED NEEDS/PLAN:  [Trigger DISPOSITION list for any items marked]    [x]  Imminent safety risk - self [] Imminent safety risk - others   [x]  Acute substance withdrawal []  Psychosis/Impaired reality testing   [x]  Mood/anxiety [x]  Substance use/Addictive behavior   [x]  Maladaptive behaviro []  Parent/child conflict   []  Family/Couples conflict []  Biomedical   []  Housing []  Financial   []   Legal   Occupational/Educational   []  Domestic violence []  Other:     Recommended Plan of Care:  Refer to Legal Hold and [facility] and 1:1 Observation  *Telesitter may not be utilized for moderate or high risk patients    Has the Recommended Plan of Care/Level of Observation been reviewed with the patient's assigned nurse? yes    Does patient/parent or guardian express agreement with the above plan? yes    Referral appointment(s) scheduled? N\A    Alert team only:   I have discussed findings and recommendations with Dr. Tai who is in agreement with these recommendations.     [facility] information [facility] to the following outpatient community providers : [facility] for follow-up residential substance use disorder treatment upon discharge from acute psychiatric hospital stay    Referral information sent to the following inpatient community providers : [facility], [facility] , [facility], [facility]    If applicable : Referred  to  Alert Team for legal hold follow up at (time): 05/25/25 at 16:35      Jessica Palomino L.C.S.W.  5/26/2025

## 2025-05-26 NOTE — ED NOTES
PIV placed. Labs collected and sent. EKG completed by ED tech. Patient placed on continuous monitoring per ERP.

## 2025-05-26 NOTE — ED PROVIDER NOTES
"ED Provider Note    ED PHYSICIAN NOTE    CHIEF COMPLAINT  Chief Complaint   Patient presents with    Suicidal Ideation     BIB EMS after patient took a handful of amlodipine. Pt reports suicidal ideation. Per report pills were spit out and not swallowed. Legal hold initiated by [facility] team at 1235. Pt reports worsening depression since death of his father and death of his girlfriend's father.        EXTERNAL RECORDS REVIEWED  Outpatient Notes primary care visit from March 2025 with noted history of hypertension, generalized anxiety disorder recurrent major depressive disorder as well as avascular necrosis of the femur    HPI/ROS  LIMITATION TO HISTORY   Select: : None  OUTSIDE HISTORIAN(S):  none    Jinny Desai is a 24 y.o. male who presents with suicidal ideation by overdose.  Patient has had worsening depression due to to deaths of family members recently.  He does have history of longstanding depression.  He states he took a handful of his amlodipine but then spit it out.  He does report that he wanted to Himself.  He reports no hallucinations or thoughts of harming others.  He reports no recent illness fevers chills cough or congestion, no chest pain, abdominal pain, nausea or vomiting    PAST MEDICAL HISTORY  Past Medical History[1]    SOCIAL HISTORY  Social History[2]    CURRENT MEDICATIONS  Home Medications       Reviewed by Ge Gilliland ([facility]) on 05/26/25 at 2004  Med List Status: Complete     Medication Last Dose Status   amLODIPine (NORVASC) 5 MG Tab 5/25/2025 Active   sertraline (ZOLOFT) 25 MG tablet 5/12/2025 Active                    ALLERGIES  Allergies[3]    PHYSICAL EXAM  VITAL SIGNS: /83   Pulse 74   Temp 36.8 °C (98.2 °F) (Temporal)   Resp 16   Ht 1.702 m (5' 7\")   Wt 99.8 kg (220 lb)   SpO2 94%   BMI 34.46 kg/m²    Constitutional: Awake and alert   HENT: Normal inspection, no signs of trauma  Eyes: Normal inspection, Pupils equal, non-icteric  Neck: Grossly normal " range of motion. No stridor  Cardiovascular: Regular rate and rhythm, no murmurs.    Thorax & Lungs: No respiratory distress, No wheezing, No rales, No rhonchi, No chest tenderness.   Abdomen:  soft, non-distended, nontender, no mass  Skin: No obvious rash. Warm. Dry.   Back: No tenderness, No CVA tenderness.   Extremities: No cyanosis, no edema  Neurologic: AO3, Grossly normal,  Psychiatric: Depressed        DIAGNOSTIC STUDIES / PROCEDURES  LABS/EKG  Results for orders placed or performed during the hospital encounter of 05/26/25   CBC WITH DIFFERENTIAL    Collection Time: 05/26/25  1:27 PM   Result Value Ref Range    WBC 5.5 4.8 - 10.8 K/uL    RBC 5.34 4.70 - 6.10 M/uL    Hemoglobin 16.0 14.0 - 18.0 g/dL    Hematocrit 46.6 42.0 - 52.0 %    MCV 87.3 81.4 - 97.8 fL    MCH 30.0 27.0 - 33.0 pg    MCHC 34.3 32.3 - 36.5 g/dL    RDW 43.4 35.9 - 50.0 fL    Platelet Count 238 164 - 446 K/uL    MPV 10.8 9.0 - 12.9 fL    Neutrophils-Polys 33.90 (L) 44.00 - 72.00 %    Lymphocytes 52.20 (H) 22.00 - 41.00 %    Monocytes 13.90 (H) 0.00 - 13.40 %    Eosinophils 0.00 0.00 - 6.90 %    Basophils 0.00 0.00 - 1.80 %    Nucleated RBC 0.00 0.00 - 0.20 /100 WBC    Neutrophils (Absolute) 1.86 1.82 - 7.42 K/uL    Lymphs (Absolute) 2.87 1.00 - 4.80 K/uL    Monos (Absolute) 0.80 0.00 - 0.85 K/uL    Eos (Absolute) 0.00 0.00 - 0.51 K/uL    Baso (Absolute) 0.00 0.00 - 0.12 K/uL    NRBC (Absolute) 0.00 K/uL    Anisocytosis 1+     Microcytosis 1+    COMP METABOLIC PANEL    Collection Time: 05/26/25  1:27 PM   Result Value Ref Range    Sodium 142 135 - 145 mmol/L    Potassium 4.4 3.6 - 5.5 mmol/L    Chloride 107 96 - 112 mmol/L    Co2 21 20 - 33 mmol/L    Anion Gap 14.0 7.0 - 16.0    Glucose 127 (H) 65 - 99 mg/dL    Bun 10 8 - 22 mg/dL    Creatinine 1.16 0.50 - 1.40 mg/dL    Calcium 8.8 8.5 - 10.5 mg/dL    Correct Calcium 8.5 8.5 - 10.5 mg/dL    AST(SGOT) 32 12 - 45 U/L    ALT(SGPT) 36 2 - 50 U/L    Alkaline Phosphatase 53 30 - 99 U/L    Total  Bilirubin <0.2 0.1 - 1.5 mg/dL    Albumin 4.4 3.2 - 4.9 g/dL    Total Protein 7.3 6.0 - 8.2 g/dL    Globulin 2.9 1.9 - 3.5 g/dL    A-G Ratio 1.5 g/dL   DIAGNOSTIC ALCOHOL    Collection Time: 25  1:27 PM   Result Value Ref Range    Diagnostic Alcohol 121.0 (H) <10.1 mg/dL   Salicylate    Collection Time: 25  1:27 PM   Result Value Ref Range    Salicylates, Quant. <1.0 (L) 15.0 - 25.0 mg/dL   ACETAMINOPHEN    Collection Time: 25  1:27 PM   Result Value Ref Range    Acetaminophen -Tylenol <5.0 (L) 10.0 - 30.0 ug/mL   ESTIMATED GFR    Collection Time: 25  1:27 PM   Result Value Ref Range    GFR (CKD-EPI) 89 >60 mL/min/1.73 m 2   DIFFERENTIAL MANUAL    Collection Time: 25  1:27 PM   Result Value Ref Range    Manual Diff Status PERFORMED    PERIPHERAL SMEAR REVIEW    Collection Time: 25  1:27 PM   Result Value Ref Range    Peripheral Smear Review see below    PLATELET ESTIMATE    Collection Time: 25  1:27 PM   Result Value Ref Range    Plt Estimation Normal    MORPHOLOGY    Collection Time: 25  1:27 PM   Result Value Ref Range    RBC Morphology Present     Reactive Lymphocytes Few    EKG    Collection Time: 25  2:05 PM   Result Value Ref Range    Report       [facility]    Test Date:  2025  Pt Name:    IVANNA JO             Department: ER  MRN:        [id number]                      Room:       [facility]  Gender:     Male                         Technician: [id number]  :        2000                   Requested By:RICH DAVID  Order #:    [id number]                    Reading MD: RICH DAVID MD    Measurements  Intervals                                Axis  Rate:       67                           P:          -16  IA:         139                          QRS:        22  QRSD:       81                           T:          29  QT:         356  QTc:        376    Interpretive Statements  Sinus rhythm  Ventricular  premature complex  Low voltage, precordial leads  Compared to ECG 08/20/2022 23:15:23  Ventricular premature complex(es) now present  Low QRS voltage now present  T-wave abnormality no longer present  Electronically Signed On 05- 14:05:17 PDT by RICH DAVID MD         I have independently interpreted this EKG as documented above            COURSE & MEDICAL DECISION MAKING    INITIAL ASSESSMENT, COURSE AND PLAN  Care Narrative: 1:13 PM  Patient presents with suicidal ideation and potential overdose.  I have ordered for diagnostic labs with metabolic panel, salicylate acetaminophen, ECG as well, legal hold has already been initiated.  Will continue to observe on telemetry monitoring given potential calcium channel blocker ingestion although he reports this was filled out    6:41 PM  Has been seen by behavioral health, legal hold extended.  Pending transfer to inpatient psychiatric facility    Interventions  Medications - No data to display    Measures  ED OBS: Yes; I am placing the patient in to an observation status due to a diagnostic uncertainty as well as therapeutic intensity. Patient placed in observation status at 2:04 PM, 5/26/2025.     Observation plan is as follows: Diagnostic evaluation with labs, ECG, observation with potential calcium channel overdose, inpatient psychiatric evaluation    Patient's care signed over to Dr. Martinez pending transfer to inpatient psychiatric facility       PROBLEM LIST    # Suicidal ideation.  Legal hold is initiated patient be transferred inpatient psychiatric care    # Overdose.  Patient does report taking amlodipine although does not appear to have a significant overdose at this point.  He has been observed on telemetry monitoring without any bradycardia or hypotension.  No other findings of dangerous, ingestion      DISPOSITION AND DISCUSSIONS  I have discussed management of the patient with the following physicians and MARKOS's:  as noted above    The patient  admitted to ED observation in stable condition    FINAL DIAGNOSIS  1. Suicidal ideation               This dictation was created using voice recognition software. The accuracy of the dictation is limited to the abilities of the software. I expect there may be some errors of grammar and possibly content. The nursing notes were reviewed and certain aspects of this information were incorporated into this note.    Electronically signed by: Deny Tai M.D., 5/26/2025          [1] No past medical history on file.  [2]   Social History  Tobacco Use    Smoking status: Never    Smokeless tobacco: Current     Types: Chew   Vaping Use    Vaping status: Never Used   Substance Use Topics    Alcohol use: Not Currently     Comment: 3-4 times a week    Drug use: Not Currently   [3] No Known Allergies

## 2025-05-26 NOTE — ED NOTES
Coby Desai, patient's mother, left contact information with front staff for updates.  [phone]   32

## 2025-05-26 NOTE — ED TRIAGE NOTES
Chief Complaint   Patient presents with    Suicidal Ideation     BIB EMS after patient took a handful of amlodipine. Pt reports suicidal ideation. Per report pills were spit out and not swallowed. Legal hold initiated by [facility] team at 1235. Pt reports worsening depression since death of his father and death of his girlfriend's father.      Legal hold precautions in place. Personal belongings placed in belonging bag for security to search.

## 2025-05-27 NOTE — ED NOTES
Medication history reviewed with patient.  Med rec is complete.  Allergies reviewed.     Patient denies any outpatient antibiotics or anticoagulants in the last 30 days.    Patient reports he has been out of Zoloft for about 2 weeks.    Dispense history available in EPIC? Yes      Iwona Walters PhT

## 2025-05-27 NOTE — DISCHARGE PLANNING
Referral has been faxed to [facility] to send to local inpatient mental health facilities contracted with patient's insurance.

## 2025-05-27 NOTE — DISCHARGE PLANNING
Pt has a small cut on his left wrist, otherwise denies rashes or open wounds. Denies any active infections.     PCS, requested skin and infection control documents have been scanned to the chart and faxed to [facility]. Notified Christi JONES RN and Dr. Tai of transfer. Transfer packet created and placed in chart with original legal hold. [facility] will arrange [facility] pickup.